# Patient Record
Sex: MALE | Race: WHITE | NOT HISPANIC OR LATINO | Employment: OTHER | URBAN - METROPOLITAN AREA
[De-identification: names, ages, dates, MRNs, and addresses within clinical notes are randomized per-mention and may not be internally consistent; named-entity substitution may affect disease eponyms.]

---

## 2017-01-17 ENCOUNTER — OFFICE VISIT (OUTPATIENT)
Dept: URGENT CARE | Facility: CLINIC | Age: 47
End: 2017-01-17
Payer: COMMERCIAL

## 2017-01-17 VITALS
HEART RATE: 72 BPM | RESPIRATION RATE: 16 BRPM | DIASTOLIC BLOOD PRESSURE: 76 MMHG | BODY MASS INDEX: 28 KG/M2 | SYSTOLIC BLOOD PRESSURE: 130 MMHG | TEMPERATURE: 97.9 F | HEIGHT: 71 IN | WEIGHT: 200 LBS | OXYGEN SATURATION: 94 %

## 2017-01-17 DIAGNOSIS — J40 BRONCHITIS: ICD-10-CM

## 2017-01-17 PROCEDURE — 99203 OFFICE O/P NEW LOW 30 MIN: CPT | Performed by: FAMILY MEDICINE

## 2017-01-17 RX ORDER — DOXYCYCLINE HYCLATE 100 MG
100 TABLET ORAL 2 TIMES DAILY
Qty: 20 TAB | Refills: 0 | Status: SHIPPED | OUTPATIENT
Start: 2017-01-17 | End: 2017-01-27

## 2017-01-17 RX ORDER — CITALOPRAM 20 MG/1
10 TABLET ORAL DAILY
COMMUNITY
End: 2022-03-09

## 2017-01-17 NOTE — PROGRESS NOTES
"HPI: Vincent Lara is a 46 y.o. male who presents with   Chief Complaint   Patient presents with   • Cough     had a cold now in chest, wheezing and painful    Patient presents to urgent care with one week of cough and chest congestion feels that the cough is getting deeper he's had some wheezing and mild shortness of breath related to it when he coughs and it is spasmodic. Perhaps some fevers yesterday. Denies feeling lightheaded or faint. Denies any history of tobacco use or asthma.     Worsened by: activity, laying supine at night, first thing in the morning, when exposed to outside allergens  Improved by: OTC symptomatic medictions    PMH: depression  MEDS:   Current outpatient prescriptions:   •  citalopram (CELEXA) 20 MG Tab, Take 20 mg by mouth every day., Disp: , Rfl:   ALLERGIES: No Known Allergies  SURGHX: History reviewed. No pertinent past surgical history.  SOCHX:  reports that he has never smoked. He has never used smokeless tobacco.  FH: Family history was reviewed, no pertinent findings to report    PE:  Vitals /76 mmHg  Pulse 72  Temp(Src) 36.6 °C (97.9 °F)  Resp 16  Ht 1.803 m (5' 10.98\")  Wt 90.719 kg (200 lb)  BMI 27.91 kg/m2  SpO2 94% pulse ox is slightly compromised   Gen AOx4, NAD  HEENT: moist mucus membranes, no pain or pressure with percussion of frontal, maxillary or ethmoid sinuses.  Bilateral conjunciva clear without erythema or exudate,  Left tm with mild erythema right tm clear without bulge, fluid or loss of landmarks, no pharyngeal erythema or tonsillar exudate or tonsillar enlargement  Neck: supple, no cervical lymphadenopathy, no signs of menigismus  CV/PULM: RRR no murmurs, no rales but ronchi and faint expiratory wheezes are present, no signs of resp distress  Abd soft nontender, bs present  Skin no rashes  Extremities -c/c/e  Neuro appropriate affect,     A/P  1. Bronchitis  doxycycline (VIBRAMYCIN) 100 MG Tab    Hydrocod Polst-CPM Polst ER " (TUSSIONEX) 10-8 MG/5ML Suspension Extended Release     If patient has worsening symptoms over the next several days or does not have significant improvement recommended to get reevaluated perhaps consider chest x-ray.

## 2017-01-17 NOTE — MR AVS SNAPSHOT
"Vincent Lara   2017 9:00 AM   Office Visit   MRN: [id number]    Department:  [facility]   Dept Phone:  [phone]    Description:  Male : 1970   Provider:  Cynthia Smith D.O.           Reason for Visit     Cough had a cold now in chest, wheezing and painful       Allergies as of 2017     No Known Allergies      You were diagnosed with     Bronchitis   [007579]         Vital Signs     Blood Pressure Pulse Temperature Respirations Height Weight    130/76 mmHg 72 36.6 °C (97.9 °F) 16 1.803 m (5' 10.98\") 90.719 kg (200 lb)    Body Mass Index Oxygen Saturation Smoking Status             27.91 kg/m2 94% Never Smoker          Basic Information     Date Of Birth Sex Race Ethnicity Preferred Language    1970 Male White Non- English      Health Maintenance     Patient has no pending health maintenance at this time      Current Immunizations     No immunizations on file.      Below and/or attached are the medications your provider expects you to take. Review all of your home medications and newly ordered medications with your provider and/or pharmacist. Follow medication instructions as directed by your provider and/or pharmacist. Please keep your medication list with you and share with your provider. Update the information when medications are discontinued, doses are changed, or new medications (including over-the-counter products) are added; and carry medication information at all times in the event of emergency situations     Allergies:  No Known Allergies          Medications  Valid as of: 2017 -  9:23 AM    Generic Name Brand Name Tablet Size Instructions for use    Citalopram Hydrobromide (Tab) CELEXA 20 MG Take 20 mg by mouth every day.        Doxycycline Hyclate (Tab) VIBRAMYCIN 100 MG Take 1 Tab by mouth 2 times a day for 10 days.        Hydrocod Polst-Chlorphen Polst (Suspension Extended Release) TUSSIONEX 10-8 MG/5ML Take 5 mL by mouth 2 " times a day as needed for Cough or Rhinitis. Will cause sedation, avoid driving, operating heavy machinery, and drinking alcohol        .                 Medicines prescribed today were sent to:     [facility] [place] [facility], [place] - [address]    [address] [place] [address]    Phone: [phone] Fax: [phone]    Open 24 Hours?: No      Medication refill instructions:       If your prescription bottle indicates you have medication refills left, it is not necessary to call your provider’s office. Please contact your pharmacy and they will refill your medication.    If your prescription bottle indicates you do not have any refills left, you may request refills at any time through one of the following ways: The online [employer] system (except Urgent Care), by calling your provider’s office, or by asking your pharmacy to contact your provider’s office with a refill request. Medication refills are processed only during regular business hours and may not be available until the next business day. Your provider may request additional information or to have a follow-up visit with you prior to refilling your medication.   *Please Note: Medication refills are assigned a new Rx number when refilled electronically. Your pharmacy may indicate that no refills were authorized even though a new prescription for the same medication is available at the pharmacy. Please request the medicine by name with the pharmacy before contacting your provider for a refill.           [employer] Access Code: [email or web address]  Expires: 2/16/2017  9:23 AM    [employer]  A secure, online tool to manage your health information     [employer]’s [employer]® is a secure, online tool that connects you to your personalized health information from the privacy of your home -- day or night - making it very easy for you to manage your healthcare. Once the activation process is completed, you can even access your medical information using the  [employer] anatoliy, which is available for free in the Apple An[employer] store or Google Play store.     [employer] provides the following levels of access (as shown below):   My Chart Features   [facility] Doctor [facility]  Specialists [facility]  Urgent  Care Non-[facility]  Primary Care  Doctor   Email your healthcare team securely and privately 24/7 X X X    Manage appointments: schedule your next appointment; view details of past/upcoming appointments X      Request prescription refills. X      View recent personal medical records, including lab and immunizations X X X X   View health record, including health history, allergies, medications X X X X   Read reports about your outpatient visits, procedures, consult and ER notes X X X X   See your discharge summary, which is a recap of your hospital and/or ER visit that includes your diagnosis, lab results, and care plan. X X       How to register for [employer]:  1. Go to  [email or web address].  2. Click on the Sign Up Now box, which takes you to the New Member Sign Up page. You will need to provide the following information:  a. Enter your [employer] Access Code exactly as it appears at the top of this page. (You will not need to use this code after you’ve completed the sign-up process. If you do not sign up before the expiration date, you must request a new code.)   b. Enter your date of birth.   c. Enter your home email address.   d. Click Submit, and follow the next screen’s instructions.  3. Create a [employer] ID. This will be your [employer] login ID and cannot be changed, so think of one that is secure and easy to remember.  4. Create a [employer] password. You can change your password at any time.  5. Enter your Password Reset Question and Answer. This can be used at a later time if you forget your password.   6. Enter your e-mail address. This allows you to receive e-mail notifications when new information is available in [employer].  7. Click Sign Up. You can now view your health  information.    For assistance activating your [employer] account, call [phone]

## 2019-02-08 ENCOUNTER — OFFICE VISIT (OUTPATIENT)
Dept: URGENT CARE | Facility: CLINIC | Age: 49
End: 2019-02-08
Payer: COMMERCIAL

## 2019-02-08 VITALS
SYSTOLIC BLOOD PRESSURE: 128 MMHG | HEART RATE: 78 BPM | DIASTOLIC BLOOD PRESSURE: 84 MMHG | OXYGEN SATURATION: 94 % | WEIGHT: 218 LBS | TEMPERATURE: 98.4 F | BODY MASS INDEX: 30.52 KG/M2 | HEIGHT: 71 IN

## 2019-02-08 DIAGNOSIS — R55 SYNCOPE AND COLLAPSE: ICD-10-CM

## 2019-02-08 DIAGNOSIS — R06.2 WHEEZING: ICD-10-CM

## 2019-02-08 DIAGNOSIS — R06.02 SOB (SHORTNESS OF BREATH): ICD-10-CM

## 2019-02-08 DIAGNOSIS — R05.9 COUGH: ICD-10-CM

## 2019-02-08 DIAGNOSIS — J22 LRTI (LOWER RESPIRATORY TRACT INFECTION): Primary | ICD-10-CM

## 2019-02-08 PROCEDURE — 94640 AIRWAY INHALATION TREATMENT: CPT | Performed by: NURSE PRACTITIONER

## 2019-02-08 PROCEDURE — 99214 OFFICE O/P EST MOD 30 MIN: CPT | Mod: 25 | Performed by: NURSE PRACTITIONER

## 2019-02-08 RX ORDER — ALBUTEROL SULFATE 2.5 MG/3ML
2.5 SOLUTION RESPIRATORY (INHALATION) ONCE
Status: COMPLETED | OUTPATIENT
Start: 2019-02-08 | End: 2019-02-08

## 2019-02-08 RX ORDER — DOXYCYCLINE HYCLATE 100 MG/1
100 CAPSULE ORAL 2 TIMES DAILY
Qty: 20 CAP | Refills: 0 | Status: SHIPPED | OUTPATIENT
Start: 2019-02-08 | End: 2019-02-18

## 2019-02-08 RX ORDER — ALBUTEROL SULFATE 90 UG/1
2 AEROSOL, METERED RESPIRATORY (INHALATION) EVERY 6 HOURS PRN
Qty: 8.5 G | Refills: 0 | Status: SHIPPED | OUTPATIENT
Start: 2019-02-08 | End: 2019-12-31

## 2019-02-08 RX ADMIN — ALBUTEROL SULFATE 2.5 MG: 2.5 SOLUTION RESPIRATORY (INHALATION) at 10:48

## 2019-02-08 ASSESSMENT — ENCOUNTER SYMPTOMS
HEMOPTYSIS: 0
DIZZINESS: 0
FEVER: 0
SHORTNESS OF BREATH: 1
NECK PAIN: 0
NAUSEA: 0
BACK PAIN: 0
WHEEZING: 1
HEADACHES: 0
SPUTUM PRODUCTION: 0
COUGH: 1
PALPITATIONS: 0

## 2019-02-08 ASSESSMENT — LIFESTYLE VARIABLES: SUBSTANCE_ABUSE: 0

## 2019-02-08 NOTE — PROGRESS NOTES
"Subjective:      Vincent Lara is a 48 y.o. male who presents with Cough (productive with green mucus/lightheaded after coughing/passed out last night x1 month)    Reviewed past medical, surgical and family history with patient in electronic health record today.     No Known Allergies          HPI this is a new problem.  Vincent is a 48-year-old male patient presents with productive cough of green sputum times 1 month.  Symptoms are getting rapidly and progressively worse.  Last night he coughed so hard that he passed out for seconds.  His wife is an advanced practice nurse who witnessed the event.  She said initially she thought he might be having a seizure but then he woke up very quickly.  He did fall to the floor.  No head trauma.  He was not postictal.  No loss of bowel or bladder function.  No oral trauma.  He does have a very strong cough.  He has been feeling rattling in his right chest with an occasional wheeze.  Denies shortness of breath, chest pain.  When he coughs very hard sometimes he feels pain in his head while he is coughing.  The pain quickly resolves when the coughing stops.  No other aggravating or alleviating factors.    Review of Systems   Constitutional: Negative for fever and malaise/fatigue.   HENT: Negative for hearing loss.    Respiratory: Positive for cough, shortness of breath (with activity or coughing. Very mild dyspnea) and wheezing. Negative for hemoptysis and sputum production.    Cardiovascular: Negative for chest pain and palpitations.   Gastrointestinal: Negative for nausea.   Musculoskeletal: Negative for back pain and neck pain.   Neurological: Negative for dizziness and headaches.   Psychiatric/Behavioral: Negative for substance abuse.          Objective:     /84   Pulse 78   Temp 36.9 °C (98.4 °F)   Ht 1.803 m (5' 10.98\")   Wt 98.9 kg (218 lb)   SpO2 94%   BMI 30.42 kg/m²      Physical Exam   Constitutional: He is oriented to person, place, and time. " Vital signs are normal. He appears well-developed and well-nourished.  Non-toxic appearance. No distress.   HENT:   Head: Normocephalic and atraumatic.   Right Ear: Hearing, external ear and ear canal normal. Tympanic membrane is injected.   Left Ear: Hearing, tympanic membrane, external ear and ear canal normal.   Nose: Nose normal.   Mouth/Throat: Uvula is midline and mucous membranes are normal. No uvula swelling. Posterior oropharyngeal erythema present.   Eyes: Pupils are equal, round, and reactive to light. Conjunctivae are normal. Right eye exhibits no discharge. Left eye exhibits no discharge.   Neck: Neck supple.   Cardiovascular: Normal rate and regular rhythm.    Pulmonary/Chest: Effort normal. No accessory muscle usage. He has no decreased breath sounds. He has wheezes. He has no rhonchi. He has rales in the right middle field and the right lower field.   Moist congested cough.  Very strong cough.   Lymphadenopathy:        Head (right side): No submental, no submandibular and no tonsillar adenopathy present.        Head (left side): No submental, no submandibular and no tonsillar adenopathy present.     He has no cervical adenopathy.        Right: No supraclavicular adenopathy present.        Left: No supraclavicular adenopathy present.   Neurological: He is alert and oriented to person, place, and time.   Skin: Skin is warm and dry. Capillary refill takes less than 2 seconds.   Psychiatric: He has a normal mood and affect. His behavior is normal. Thought content normal.   Nursing note and vitals reviewed.        Albuterol Neb. Demonstration &/or evaluation of pt utilization of a nebulizer and evaluation of results. Pt tolerated well. No adverse events. SpO2 post treatment 98%. Improved Breath sounds bilaterally, increased Vt. Persistent rales RLL, RML with scattered rhonchi t/o that does not clear with cough.          Assessment/Plan:       1. LRTI (lower respiratory tract infection)  doxycycline  (VIBRAMYCIN) 100 MG Cap   2. Syncope and collapse      DD: vaso-vagal response to strong coughing   3. Cough     4. Wheezing  albuterol (PROVENTIL) 2.5mg/3ml nebulizer solution 2.5 mg    albuterol 108 (90 Base) MCG/ACT Aero Soln inhalation aerosol     OTC anti-tussive medication of choice to help cough. Dosage and directions per .     Keep well hydrated    Educated in proper administration of medication(s) ordered today including safety, possible SE, risks, benefits, rationale and alternatives to therapy.  May take doxycycline with food to reduce side effects and GI upset.     Return to clinic or PCP 5-7  days if current symptoms are not resolving in a satisfactory manner or sooner if new or worsening symptoms occur.   Patient was advised of signs and symptoms which would warrant further evaluation and /or emergent evaluation in ER.  Verbalized agreement with this treatment plan and seemed to understand without barriers. Questions were encouraged and answered to patients satisfaction.   Aftercare instructions were given to pt/ caregiver.

## 2019-02-10 ENCOUNTER — TELEPHONE (OUTPATIENT)
Dept: URGENT CARE | Facility: CLINIC | Age: 49
End: 2019-02-10

## 2019-02-11 ENCOUNTER — TELEPHONE (OUTPATIENT)
Dept: URGENT CARE | Facility: CLINIC | Age: 49
End: 2019-02-11

## 2019-02-11 ENCOUNTER — APPOINTMENT (OUTPATIENT)
Dept: RADIOLOGY | Facility: IMAGING CENTER | Age: 49
End: 2019-02-11
Attending: NURSE PRACTITIONER
Payer: COMMERCIAL

## 2019-02-11 ENCOUNTER — APPOINTMENT (OUTPATIENT)
Dept: URGENT CARE | Facility: CLINIC | Age: 49
End: 2019-02-11
Payer: COMMERCIAL

## 2019-02-11 DIAGNOSIS — R06.02 SOB (SHORTNESS OF BREATH): ICD-10-CM

## 2019-02-11 DIAGNOSIS — R05.9 COUGH: ICD-10-CM

## 2019-02-11 DIAGNOSIS — R55 SYNCOPE AND COLLAPSE: ICD-10-CM

## 2019-02-11 DIAGNOSIS — J22 LRTI (LOWER RESPIRATORY TRACT INFECTION): ICD-10-CM

## 2019-02-11 DIAGNOSIS — R06.2 WHEEZING: ICD-10-CM

## 2019-02-11 PROCEDURE — 71046 X-RAY EXAM CHEST 2 VIEWS: CPT | Mod: 26 | Performed by: FAMILY MEDICINE

## 2019-02-11 RX ORDER — AMOXICILLIN AND CLAVULANATE POTASSIUM 875; 125 MG/1; MG/1
1 TABLET, FILM COATED ORAL 2 TIMES DAILY
Qty: 20 TAB | Refills: 0 | Status: SHIPPED | OUTPATIENT
Start: 2019-02-11 | End: 2019-02-21

## 2020-01-06 ENCOUNTER — ANESTHESIA (OUTPATIENT)
Dept: SURGERY | Facility: MEDICAL CENTER | Age: 50
End: 2020-01-06
Payer: COMMERCIAL

## 2020-01-06 ENCOUNTER — HOSPITAL ENCOUNTER (OUTPATIENT)
Facility: MEDICAL CENTER | Age: 50
End: 2020-01-06
Attending: SURGERY | Admitting: SURGERY
Payer: COMMERCIAL

## 2020-01-06 ENCOUNTER — ANESTHESIA EVENT (OUTPATIENT)
Dept: SURGERY | Facility: MEDICAL CENTER | Age: 50
End: 2020-01-06
Payer: COMMERCIAL

## 2020-01-06 VITALS
OXYGEN SATURATION: 94 % | SYSTOLIC BLOOD PRESSURE: 114 MMHG | WEIGHT: 210.1 LBS | BODY MASS INDEX: 30.08 KG/M2 | TEMPERATURE: 99.1 F | DIASTOLIC BLOOD PRESSURE: 64 MMHG | HEART RATE: 75 BPM | HEIGHT: 70 IN | RESPIRATION RATE: 18 BRPM

## 2020-01-06 PROCEDURE — 160029 HCHG SURGERY MINUTES - 1ST 30 MINS LEVEL 4: Performed by: SURGERY

## 2020-01-06 PROCEDURE — 160025 RECOVERY II MINUTES (STATS): Performed by: SURGERY

## 2020-01-06 PROCEDURE — 160002 HCHG RECOVERY MINUTES (STAT): Performed by: SURGERY

## 2020-01-06 PROCEDURE — 160041 HCHG SURGERY MINUTES - EA ADDL 1 MIN LEVEL 4: Performed by: SURGERY

## 2020-01-06 PROCEDURE — 502714 HCHG ROBOTIC SURGERY SERVICES: Performed by: SURGERY

## 2020-01-06 PROCEDURE — 700101 HCHG RX REV CODE 250: Performed by: ANESTHESIOLOGY

## 2020-01-06 PROCEDURE — 501838 HCHG SUTURE GENERAL: Performed by: SURGERY

## 2020-01-06 PROCEDURE — 700102 HCHG RX REV CODE 250 W/ 637 OVERRIDE(OP): Performed by: ANESTHESIOLOGY

## 2020-01-06 PROCEDURE — 160009 HCHG ANES TIME/MIN: Performed by: SURGERY

## 2020-01-06 PROCEDURE — 700111 HCHG RX REV CODE 636 W/ 250 OVERRIDE (IP)

## 2020-01-06 PROCEDURE — 700101 HCHG RX REV CODE 250: Performed by: SURGERY

## 2020-01-06 PROCEDURE — 500868 HCHG NEEDLE, SURGI(VARES): Performed by: SURGERY

## 2020-01-06 PROCEDURE — 500002 HCHG ADHESIVE, DERMABOND: Performed by: SURGERY

## 2020-01-06 PROCEDURE — 700105 HCHG RX REV CODE 258: Performed by: SURGERY

## 2020-01-06 PROCEDURE — 160035 HCHG PACU - 1ST 60 MINS PHASE I: Performed by: SURGERY

## 2020-01-06 PROCEDURE — 700111 HCHG RX REV CODE 636 W/ 250 OVERRIDE (IP): Performed by: ANESTHESIOLOGY

## 2020-01-06 PROCEDURE — 160046 HCHG PACU - 1ST 60 MINS PHASE II: Performed by: SURGERY

## 2020-01-06 PROCEDURE — A9270 NON-COVERED ITEM OR SERVICE: HCPCS | Performed by: ANESTHESIOLOGY

## 2020-01-06 PROCEDURE — 160048 HCHG OR STATISTICAL LEVEL 1-5: Performed by: SURGERY

## 2020-01-06 PROCEDURE — 160047 HCHG PACU  - EA ADDL 30 MINS PHASE II: Performed by: SURGERY

## 2020-01-06 PROCEDURE — C1781 MESH (IMPLANTABLE): HCPCS | Performed by: SURGERY

## 2020-01-06 DEVICE — MESH PROGRIP LAPROSCOPIC SELF FIXATING (1/CA): Type: IMPLANTABLE DEVICE | Status: FUNCTIONAL

## 2020-01-06 RX ORDER — MIDAZOLAM HYDROCHLORIDE 1 MG/ML
INJECTION INTRAMUSCULAR; INTRAVENOUS PRN
Status: DISCONTINUED | OUTPATIENT
Start: 2020-01-06 | End: 2020-01-06 | Stop reason: SURG

## 2020-01-06 RX ORDER — OXYCODONE HCL 5 MG/5 ML
10 SOLUTION, ORAL ORAL
Status: COMPLETED | OUTPATIENT
Start: 2020-01-06 | End: 2020-01-06

## 2020-01-06 RX ORDER — SODIUM CHLORIDE, SODIUM LACTATE, POTASSIUM CHLORIDE, CALCIUM CHLORIDE 600; 310; 30; 20 MG/100ML; MG/100ML; MG/100ML; MG/100ML
INJECTION, SOLUTION INTRAVENOUS CONTINUOUS
Status: DISCONTINUED | OUTPATIENT
Start: 2020-01-06 | End: 2020-01-06 | Stop reason: HOSPADM

## 2020-01-06 RX ORDER — CEFAZOLIN SODIUM 1 G/3ML
INJECTION, POWDER, FOR SOLUTION INTRAMUSCULAR; INTRAVENOUS PRN
Status: DISCONTINUED | OUTPATIENT
Start: 2020-01-06 | End: 2020-01-06 | Stop reason: SURG

## 2020-01-06 RX ORDER — SCOLOPAMINE TRANSDERMAL SYSTEM 1 MG/1
PATCH, EXTENDED RELEASE TRANSDERMAL
Status: DISCONTINUED
Start: 2020-01-06 | End: 2020-01-06 | Stop reason: HOSPADM

## 2020-01-06 RX ORDER — HYDRALAZINE HYDROCHLORIDE 20 MG/ML
5 INJECTION INTRAMUSCULAR; INTRAVENOUS
Status: DISCONTINUED | OUTPATIENT
Start: 2020-01-06 | End: 2020-01-06 | Stop reason: HOSPADM

## 2020-01-06 RX ORDER — ACETAMINOPHEN 500 MG
1000 TABLET ORAL ONCE
Status: COMPLETED | OUTPATIENT
Start: 2020-01-06 | End: 2020-01-06

## 2020-01-06 RX ORDER — DEXAMETHASONE SODIUM PHOSPHATE 4 MG/ML
INJECTION, SOLUTION INTRA-ARTICULAR; INTRALESIONAL; INTRAMUSCULAR; INTRAVENOUS; SOFT TISSUE PRN
Status: DISCONTINUED | OUTPATIENT
Start: 2020-01-06 | End: 2020-01-06 | Stop reason: SURG

## 2020-01-06 RX ORDER — HYDROMORPHONE HYDROCHLORIDE 1 MG/ML
0.2 INJECTION, SOLUTION INTRAMUSCULAR; INTRAVENOUS; SUBCUTANEOUS
Status: DISCONTINUED | OUTPATIENT
Start: 2020-01-06 | End: 2020-01-06 | Stop reason: HOSPADM

## 2020-01-06 RX ORDER — KETOROLAC TROMETHAMINE 30 MG/ML
INJECTION, SOLUTION INTRAMUSCULAR; INTRAVENOUS PRN
Status: DISCONTINUED | OUTPATIENT
Start: 2020-01-06 | End: 2020-01-06 | Stop reason: SURG

## 2020-01-06 RX ORDER — OXYCODONE HCL 5 MG/5 ML
5 SOLUTION, ORAL ORAL
Status: COMPLETED | OUTPATIENT
Start: 2020-01-06 | End: 2020-01-06

## 2020-01-06 RX ORDER — HYDROMORPHONE HYDROCHLORIDE 1 MG/ML
0.1 INJECTION, SOLUTION INTRAMUSCULAR; INTRAVENOUS; SUBCUTANEOUS
Status: DISCONTINUED | OUTPATIENT
Start: 2020-01-06 | End: 2020-01-06 | Stop reason: HOSPADM

## 2020-01-06 RX ORDER — BUPIVACAINE HYDROCHLORIDE AND EPINEPHRINE 5; 5 MG/ML; UG/ML
INJECTION, SOLUTION EPIDURAL; INTRACAUDAL; PERINEURAL
Status: DISCONTINUED | OUTPATIENT
Start: 2020-01-06 | End: 2020-01-06 | Stop reason: HOSPADM

## 2020-01-06 RX ORDER — LABETALOL HYDROCHLORIDE 5 MG/ML
5 INJECTION, SOLUTION INTRAVENOUS
Status: DISCONTINUED | OUTPATIENT
Start: 2020-01-06 | End: 2020-01-06 | Stop reason: HOSPADM

## 2020-01-06 RX ORDER — ONDANSETRON 2 MG/ML
4 INJECTION INTRAMUSCULAR; INTRAVENOUS
Status: DISCONTINUED | OUTPATIENT
Start: 2020-01-06 | End: 2020-01-06 | Stop reason: HOSPADM

## 2020-01-06 RX ORDER — ONDANSETRON 2 MG/ML
INJECTION INTRAMUSCULAR; INTRAVENOUS PRN
Status: DISCONTINUED | OUTPATIENT
Start: 2020-01-06 | End: 2020-01-06 | Stop reason: SURG

## 2020-01-06 RX ORDER — ROCURONIUM BROMIDE 10 MG/ML
INJECTION, SOLUTION INTRAVENOUS PRN
Status: DISCONTINUED | OUTPATIENT
Start: 2020-01-06 | End: 2020-01-06 | Stop reason: SURG

## 2020-01-06 RX ORDER — HALOPERIDOL 5 MG/ML
1 INJECTION INTRAMUSCULAR
Status: DISCONTINUED | OUTPATIENT
Start: 2020-01-06 | End: 2020-01-06 | Stop reason: HOSPADM

## 2020-01-06 RX ORDER — MEPERIDINE HYDROCHLORIDE 25 MG/ML
12.5 INJECTION INTRAMUSCULAR; INTRAVENOUS; SUBCUTANEOUS
Status: DISCONTINUED | OUTPATIENT
Start: 2020-01-06 | End: 2020-01-06 | Stop reason: HOSPADM

## 2020-01-06 RX ORDER — LIDOCAINE HYDROCHLORIDE 20 MG/ML
INJECTION, SOLUTION EPIDURAL; INFILTRATION; INTRACAUDAL; PERINEURAL PRN
Status: DISCONTINUED | OUTPATIENT
Start: 2020-01-06 | End: 2020-01-06 | Stop reason: SURG

## 2020-01-06 RX ORDER — HYDROMORPHONE HYDROCHLORIDE 1 MG/ML
0.4 INJECTION, SOLUTION INTRAMUSCULAR; INTRAVENOUS; SUBCUTANEOUS
Status: DISCONTINUED | OUTPATIENT
Start: 2020-01-06 | End: 2020-01-06 | Stop reason: HOSPADM

## 2020-01-06 RX ORDER — LORAZEPAM 2 MG/ML
0.5 INJECTION INTRAMUSCULAR
Status: DISCONTINUED | OUTPATIENT
Start: 2020-01-06 | End: 2020-01-06 | Stop reason: HOSPADM

## 2020-01-06 RX ORDER — DIPHENHYDRAMINE HYDROCHLORIDE 50 MG/ML
12.5 INJECTION INTRAMUSCULAR; INTRAVENOUS
Status: DISCONTINUED | OUTPATIENT
Start: 2020-01-06 | End: 2020-01-06 | Stop reason: HOSPADM

## 2020-01-06 RX ORDER — LIDOCAINE HYDROCHLORIDE 10 MG/ML
INJECTION, SOLUTION EPIDURAL; INFILTRATION; INTRACAUDAL; PERINEURAL
Status: COMPLETED
Start: 2020-01-06 | End: 2020-01-06

## 2020-01-06 RX ORDER — LIDOCAINE HYDROCHLORIDE 40 MG/ML
SOLUTION TOPICAL PRN
Status: DISCONTINUED | OUTPATIENT
Start: 2020-01-06 | End: 2020-01-06 | Stop reason: SURG

## 2020-01-06 RX ADMIN — OXYCODONE HYDROCHLORIDE 10 MG: 5 SOLUTION ORAL at 15:44

## 2020-01-06 RX ADMIN — ONDANSETRON 4 MG: 2 INJECTION INTRAMUSCULAR; INTRAVENOUS at 15:27

## 2020-01-06 RX ADMIN — FENTANYL CITRATE 150 MCG: 50 INJECTION, SOLUTION INTRAMUSCULAR; INTRAVENOUS at 14:28

## 2020-01-06 RX ADMIN — FENTANYL CITRATE 100 MCG: 50 INJECTION, SOLUTION INTRAMUSCULAR; INTRAVENOUS at 15:03

## 2020-01-06 RX ADMIN — LIDOCAINE HYDROCHLORIDE 4 ML: 40 SOLUTION TOPICAL at 14:29

## 2020-01-06 RX ADMIN — ROCURONIUM BROMIDE 50 MG: 10 INJECTION, SOLUTION INTRAVENOUS at 14:28

## 2020-01-06 RX ADMIN — MIDAZOLAM 2 MG: 1 INJECTION INTRAMUSCULAR; INTRAVENOUS at 14:24

## 2020-01-06 RX ADMIN — FENTANYL CITRATE 50 MCG: 0.05 INJECTION, SOLUTION INTRAMUSCULAR; INTRAVENOUS at 15:46

## 2020-01-06 RX ADMIN — SUGAMMADEX 200 MG: 100 INJECTION, SOLUTION INTRAVENOUS at 15:27

## 2020-01-06 RX ADMIN — ACETAMINOPHEN 1000 MG: 500 TABLET ORAL at 13:27

## 2020-01-06 RX ADMIN — LIDOCAINE HYDROCHLORIDE 100 MG: 20 INJECTION, SOLUTION EPIDURAL; INFILTRATION; INTRACAUDAL at 14:28

## 2020-01-06 RX ADMIN — CEFAZOLIN 2 G: 330 INJECTION, POWDER, FOR SOLUTION INTRAMUSCULAR; INTRAVENOUS at 14:32

## 2020-01-06 RX ADMIN — LIDOCAINE HYDROCHLORIDE 0.5 ML: 10 INJECTION, SOLUTION EPIDURAL; INFILTRATION; INTRACAUDAL at 13:27

## 2020-01-06 RX ADMIN — KETOROLAC TROMETHAMINE 30 MG: 30 INJECTION, SOLUTION INTRAMUSCULAR at 15:27

## 2020-01-06 RX ADMIN — SODIUM CHLORIDE, POTASSIUM CHLORIDE, SODIUM LACTATE AND CALCIUM CHLORIDE: 600; 310; 30; 20 INJECTION, SOLUTION INTRAVENOUS at 13:26

## 2020-01-06 RX ADMIN — DEXAMETHASONE SODIUM PHOSPHATE 8 MG: 4 INJECTION, SOLUTION INTRA-ARTICULAR; INTRALESIONAL; INTRAMUSCULAR; INTRAVENOUS; SOFT TISSUE at 14:32

## 2020-01-06 RX ADMIN — PROPOFOL 200 MG: 10 INJECTION, EMULSION INTRAVENOUS at 14:28

## 2020-01-06 RX ADMIN — Medication 0.5 ML: at 13:27

## 2020-01-06 RX ADMIN — ROCURONIUM BROMIDE 50 MG: 10 INJECTION, SOLUTION INTRAVENOUS at 15:00

## 2020-01-06 SDOH — HEALTH STABILITY: MENTAL HEALTH: HOW MANY STANDARD DRINKS CONTAINING ALCOHOL DO YOU HAVE ON A TYPICAL DAY?: 3 OR 4

## 2020-01-06 SDOH — HEALTH STABILITY: MENTAL HEALTH: HOW OFTEN DO YOU HAVE 6 OR MORE DRINKS ON ONE OCCASION?: NEVER

## 2020-01-06 SDOH — HEALTH STABILITY: MENTAL HEALTH: HOW OFTEN DO YOU HAVE A DRINK CONTAINING ALCOHOL?: 4 OR MORE TIMES A WEEK

## 2020-01-06 NOTE — OR SURGEON
Immediate Post OP Note    PreOp Diagnosis:bih     PostOp Diagnosis: bih type 2 left ,type 4 right     Procedure(s):  REPAIR, HERNIA, INGUINAL, ROBOT-ASSISTED, USING DA LENNOX XI-WITH MESH - Wound Class: Clean    Surgeon(s):  CARLOZ Peters M.D.    Anesthesiologist/Type of Anesthesia:  Anesthesiologist: Corin Samayoa M.D./General    Surgical Staff:  Circulator: Yanira Comer R.N.  Relief Circulator: Eva Concepcion R.N.  Scrub Person: Brittany Mosley    Specimens removed if any:  * No specimens in log *    Estimated Blood Loss: 10    Findings: same    Complications: 0        1/6/2020 3:28 PM Randal Burnett M.D.

## 2020-01-06 NOTE — ANESTHESIA PROCEDURE NOTES
Airway  Date/Time: 1/6/2020 2:30 PM  Performed by: Corin Samayoa M.D.  Authorized by: Corin Samayoa M.D.     Location:  OR  Urgency:  Elective  Indications for Airway Management:  Anesthesia  Spontaneous Ventilation: absent    Sedation Level:  Deep  Preoxygenated: Yes    Patient Position:  Sniffing  Mask Difficulty Assessment:  1 - vent by mask  Final Airway Type:  Endotracheal airway  Final Endotracheal Airway:  ETT  Cuffed: Yes    Technique Used for Successful ETT Placement:  Direct laryngoscopy  Devices/Methods Used in Placement:  Intubating stylet  Insertion Site:  Oral  Blade Type:  Skeou  Laryngoscope Blade/Videolaryngoscope Blade Size:  3  ETT Size (mm):  7.0  Measured from:  Teeth  ETT to Teeth (cm):  21  Placement Verified by: auscultation and capnometry    Cormack-Lehane Classification:  Grade I - full view of glottis  Number of Attempts at Approach:  1

## 2020-01-06 NOTE — ANESTHESIA PREPROCEDURE EVALUATION
Relevant Problems   No relevant active problems       Physical Exam    Airway   Mallampati: I  TM distance: >3 FB  Neck ROM: full       Cardiovascular - normal exam     Dental - normal exam         Pulmonary   Breath sounds clear to auscultation     Abdominal   (+) obese     Neurological - normal exam                 Anesthesia Plan    ASA 2       Plan - general       Airway plan will be ETT        Induction: intravenous      Pertinent diagnostic labs and testing reviewed    Informed Consent:    Anesthetic plan and risks discussed with patient.

## 2020-01-06 NOTE — PROGRESS NOTES
Pre-op complete. Multimodals administered per MAR. Patient updated on plan of care. All questions answered at this time.  Call light within reach, advised to call for any questions or concerns. Hourly rounding in place.

## 2020-01-07 ASSESSMENT — PAIN SCALES - GENERAL: PAIN_LEVEL: 3

## 2020-01-07 NOTE — ANESTHESIA POSTPROCEDURE EVALUATION
Patient: Vincent Lara    Procedure Summary     Date:  01/06/20 Room / Location:  [facility] OR 11 / SURGERY [facility]    Anesthesia Start:  1424 Anesthesia Stop:  1538    Procedure:  REPAIR, HERNIA, INGUINAL, ROBOT-ASSISTED, USING DA LENNOX XI-WITH MESH (Bilateral Abdomen) Diagnosis:  (BILATERAL INGUINAL HERNIAS)    Surgeon:  Randal Burnett M.D. Responsible Provider:  Corin Samayoa M.D.    Anesthesia Type:  general ASA Status:  2          Final Anesthesia Type: general  Last vitals  BP   Blood Pressure: 114/64    Temp   37.3 °C (99.1 °F)    Pulse   Pulse: 75   Resp   18    SpO2   94 %      Anesthesia Post Evaluation    Patient location during evaluation: PACU  Patient participation: complete - patient participated  Level of consciousness: awake and alert  Pain score: 3    Airway patency: patent  Anesthetic complications: no  Cardiovascular status: adequate  Respiratory status: acceptable  Hydration status: acceptable    PONV: none           Nurse Pain Score: 3 (NPRS)

## 2020-01-07 NOTE — PROGRESS NOTES
Walked patient in the shah.  His gait was steady.  BP wnl.  States he his ready to get dressed and go home

## 2020-01-07 NOTE — OP REPORT
DATE OF SERVICE:  01/06/2020    PREOPERATIVE DIAGNOSIS:  Bilateral inguinal hernias.    POSTOPERATIVE DIAGNOSIS:  Bilateral inguinal hernias.    OPERATION PERFORMED:  Laparoscopic robotic-assisted repair of bilateral   inguinal hernias with ProGrip mesh implantation.    SURGEON:  Randal Burnett MD    ANESTHESIOLOGIST:  Corin Samayoa MD    ANESTHESIA:  General anesthesia.    ASSISTANT:  Pedro Pablo Sousa DO    OPERATIVE NOTE:  The patient is 49 years of age, presents with bilateral   symptomatic hernias, worse on the right than the left.  He was felt to be a   candidate for elective repair.  The risks, possible complications of which   were carefully explained to him in detail.  He understood and accepted those   risks and wished to proceed.  He was taken to the operating room and placed   under anesthesia by Dr. Samayoa.  After satisfactory preoperative evaluation,   he did receive prophylactic antibiotics.  He had sequential stockings applied   as anti-embolism prophylaxis.  His abdomen was shaved and prepped with   ChloraPrep solution.  Sterile drapes were applied.  A time-out was affected.    A solution of 0.5% Marcaine with epinephrine was liberally infiltrated into   all incisions.  Incision was made in the left upper quadrant.  The fascia was   elevated.  A Veress needle was inserted.  Saline drop test was permissive to   proceed with step pneumo insufflation.  Once fully pneumo insufflated, an 8-mm   trocar was placed.  There was no evidence of injury related to entry.    Patient had an 8-mm trocar placed to the right of the midline in the   epigastrium and an 8-mm trocar was placed in the right upper quadrant.  These   latter two were placed under direct vision.    The patient was positioned in Trendelenburg.  Bilateral transversus abdominis   blocks were made.  The patient had the da Mary Xi robot brought in.  It was   docked.  Instrumentation and camera introduced.  Dr. Burnett retired to the    console.    Patient had what appeared to be bilateral direct inguinal hernias.  It proved   to be that he had a type 2 hernia with some indirect component on the left and   had direct, indirect and femoral hernias on the right.  The patient had   incision made above the pelvic outlet in the peritoneum.  A peritoneal pocket   was created cephalad caudally.  This was carried down across the pubic   symphysis and across the Ed's ligament into the pelvis.  Lateral   dissection was then completed with reduction of indirect sac and lipoma of the   cords on the right.  The peritoneum was fully mobilized.  A similar   dissection was carried out on the right.  Here, he had a direct, indirect and   femoral hernia.  These were completely dissected out.  The patient had two   10x15 cm ProGrip meshes introduced, unfurled, and deployed across the pelvic   outlets on the left and right side.  The peritoneum was then closed using   running 2-0 Stratafix suture in 2 layers.    Once these were reperitonealized, the procedure was completed with retrieval   of the needles from the suture material.  The meshes appeared to be well   deployed in the retroperitoneal space.  The patient had pneumoperitoneum then   collapsed.  The robot was undocked.  Instrumentation and camera removed.  The   skin was closed in all incisions using running 4-0 Monocryl subcuticular.  The   wounds were sealed with Dermabond.  The patient was awakened, extubated, and   taken to recovery room in stable satisfactory condition.  Blood loss was truly   minimal.  There were no intraoperative complications and the procedure was   well tolerated by the patient who is anticipated to be treated on an   ambulatory basis.  He was written a prescription for oxycodone IR 5 mg #30 in   compliance with [place] regulation and will take multimodal pain medication   including Tylenol and ibuprofen.  The patient was instructed to call if any   problems arose interim from  discharge to followup.       ____________________________________     MD GUILLERMO [facility] / DANNY    DD:  01/06/2020 15:34:55  DT:  01/06/2020 16:15:48    D#:  [id number]  Job#:  [id number]    cc: Corin Samayoa MD

## 2020-01-07 NOTE — ANESTHESIA TIME REPORT
Anesthesia Start and Stop Event Times     Date Time Event    1/6/2020 1424 Ready for Procedure     1424 Anesthesia Start     1538 Anesthesia Stop        Responsible Staff  01/06/20    Name Role Begin End    Corin Samayoa M.D. Anesth 1424 1538        Preop Diagnosis (Free Text):  Pre-op Diagnosis     BILATERAL INGUINAL HERNIAS        Preop Diagnosis (Codes):    Post op Diagnosis  Bilateral inguinal hernia      Premium Reason  A. 3PM - 7AM    Comments:

## 2020-01-07 NOTE — OR NURSING
Pt awake and alert, reports pain 2/10 after pain medication, denies nausea at this time, wife updated on pt status.

## 2020-01-07 NOTE — DISCHARGE INSTRUCTIONS
ACTIVITY: Rest and take it easy for the first 24 hours.  A responsible adult is recommended to remain with you during that time.  It is normal to feel sleepy.  We encourage you to not do anything that requires balance, judgment or coordination.    MILD FLU-LIKE SYMPTOMS ARE NORMAL. YOU MAY EXPERIENCE GENERALIZED MUSCLE ACHES, THROAT IRRITATION, HEADACHE AND/OR SOME NAUSEA.    FOR 24 HOURS DO NOT:  Drive, operate machinery or run household appliances.  Drink beer or alcoholic beverages.   Make important decisions or sign legal documents.    SPECIAL INSTRUCTIONS: OK to shower. Do not remove glue from surgical site.    DIET: To avoid nausea, slowly advance diet as tolerated, avoiding spicy or greasy foods for the first day.  Add more substantial food to your diet according to your physician's instructions.  Babies can be fed formula or breast milk as soon as they are hungry.  INCREASE FLUIDS AND FIBER TO AVOID CONSTIPATION.    SURGICAL DRESSING/BATHING: Follow surgeon's instructions    FOLLOW-UP APPOINTMENT:  A follow-up appointment should be arranged with your doctor in 1-2 weeks; call to schedule.    You should CALL YOUR PHYSICIAN if you develop:  Fever greater than 101 degrees F.  Pain not relieved by medication, or persistent nausea or vomiting.  Excessive bleeding (blood soaking through dressing) or unexpected drainage from the wound.  Extreme redness or swelling around the incision site, drainage of pus or foul smelling drainage.  Inability to urinate or empty your bladder within 8 hours.  Problems with breathing or chest pain.    You should call 911 if you develop problems with breathing or chest pain.  If you are unable to contact your doctor or surgical center, you should go to the nearest emergency room or urgent care center.  Physician's telephone #: [phone]    If any questions arise, call your doctor.  If your doctor is not available, please feel free to call the Surgical Center at [phone].   The Center is open Monday through Friday from 7AM to 7PM.  You can also call the HEALTH HOTLINE open 24 hours/day, 7 days/week and speak to a nurse at [phone], or toll free at [phone].    A registered nurse may call you a few days after your surgery to see how you are doing after your procedure.    MEDICATIONS: Resume taking daily medication.  Take prescribed pain medication with food.  If no medication is prescribed, you may take non-aspirin pain medication if needed.  PAIN MEDICATION CAN BE VERY CONSTIPATING.  Take a stool softener or laxative such as senokot, pericolace, or milk of magnesia if needed.    Prescription given for oxycodone.  Last pain medication given at 3:44 pm.    If your physician has prescribed pain medication that includes Acetaminophen (Tylenol), do not take additional Acetaminophen (Tylenol) while taking the prescribed medication.    Depression / Suicide Risk    As you are discharged from this [facility] facility, it is important to learn how to keep safe from harming yourself.    Recognize the warning signs:  · Abrupt changes in personality, positive or negative- including increase in energy   · Giving away possessions  · Change in eating patterns- significant weight changes-  positive or negative  · Change in sleeping patterns- unable to sleep or sleeping all the time   · Unwillingness or inability to communicate  · Depression  · Unusual sadness, discouragement and loneliness  · Talk of wanting to die  · Neglect of personal appearance   · Rebelliousness- reckless behavior  · Withdrawal from people/activities they love  · Confusion- inability to concentrate     If you or a loved one observes any of these behaviors or has concerns about self-harm, here's what you can do:  · Talk about it- your feelings and reasons for harming yourself  · Remove any means that you might use to hurt yourself (examples: pills, rope, extension cords, firearm)  · Get professional help from the  community (Mental Health, Substance Abuse, psychological counseling)  · Do not be alone:Call your Safe Contact- someone whom you trust who will be there for you.  · Call your local CRISIS HOTLINE 167-4850 or 694-721-5260  · Call your local Children's Mobile Crisis Response Team [place] (981) 152-7886 or www.[place]  · Call the toll free National Suicide Prevention Hotlines   · National Suicide Prevention Lifeline 027-000-BLGL (1633)  · National Hope Line Network 800-SUICIDE (985-5210)

## 2022-02-11 ENCOUNTER — APPOINTMENT (OUTPATIENT)
Dept: RADIOLOGY | Facility: MEDICAL CENTER | Age: 52
End: 2022-02-11
Attending: EMERGENCY MEDICINE
Payer: COMMERCIAL

## 2022-02-11 ENCOUNTER — HOSPITAL ENCOUNTER (EMERGENCY)
Facility: MEDICAL CENTER | Age: 52
End: 2022-02-11
Attending: EMERGENCY MEDICINE
Payer: COMMERCIAL

## 2022-02-11 VITALS
RESPIRATION RATE: 15 BRPM | HEART RATE: 77 BPM | WEIGHT: 215.61 LBS | HEIGHT: 70 IN | BODY MASS INDEX: 30.87 KG/M2 | DIASTOLIC BLOOD PRESSURE: 70 MMHG | TEMPERATURE: 98 F | OXYGEN SATURATION: 94 % | SYSTOLIC BLOOD PRESSURE: 109 MMHG

## 2022-02-11 DIAGNOSIS — I48.0 PAROXYSMAL ATRIAL FIBRILLATION (HCC): ICD-10-CM

## 2022-02-11 DIAGNOSIS — R07.89 CHEST TIGHTNESS: ICD-10-CM

## 2022-02-11 LAB
ALBUMIN SERPL BCP-MCNC: 4 G/DL (ref 3.2–4.9)
ALBUMIN/GLOB SERPL: 1.6 G/DL
ALP SERPL-CCNC: 52 U/L (ref 30–99)
ALT SERPL-CCNC: 18 U/L (ref 2–50)
ANION GAP SERPL CALC-SCNC: 12 MMOL/L (ref 7–16)
APTT PPP: 27.9 SEC (ref 24.7–36)
AST SERPL-CCNC: 20 U/L (ref 12–45)
BASOPHILS # BLD AUTO: 0.4 % (ref 0–1.8)
BASOPHILS # BLD: 0.03 K/UL (ref 0–0.12)
BILIRUB SERPL-MCNC: 0.3 MG/DL (ref 0.1–1.5)
BUN SERPL-MCNC: 19 MG/DL (ref 8–22)
CALCIUM SERPL-MCNC: 8.8 MG/DL (ref 8.4–10.2)
CHLORIDE SERPL-SCNC: 104 MMOL/L (ref 96–112)
CO2 SERPL-SCNC: 21 MMOL/L (ref 20–33)
CREAT SERPL-MCNC: 0.86 MG/DL (ref 0.5–1.4)
EKG IMPRESSION: NORMAL
EKG IMPRESSION: NORMAL
EOSINOPHIL # BLD AUTO: 0.41 K/UL (ref 0–0.51)
EOSINOPHIL NFR BLD: 6.1 % (ref 0–6.9)
ERYTHROCYTE [DISTWIDTH] IN BLOOD BY AUTOMATED COUNT: 41.3 FL (ref 35.9–50)
GLOBULIN SER CALC-MCNC: 2.5 G/DL (ref 1.9–3.5)
GLUCOSE SERPL-MCNC: 112 MG/DL (ref 65–99)
HCT VFR BLD AUTO: 44.8 % (ref 42–52)
HGB BLD-MCNC: 16 G/DL (ref 14–18)
IMM GRANULOCYTES # BLD AUTO: 0.02 K/UL (ref 0–0.11)
IMM GRANULOCYTES NFR BLD AUTO: 0.3 % (ref 0–0.9)
INR PPP: 0.99 (ref 0.87–1.13)
LYMPHOCYTES # BLD AUTO: 2.47 K/UL (ref 1–4.8)
LYMPHOCYTES NFR BLD: 37 % (ref 22–41)
MAGNESIUM SERPL-MCNC: 2 MG/DL (ref 1.5–2.5)
MCH RBC QN AUTO: 33.3 PG (ref 27–33)
MCHC RBC AUTO-ENTMCNC: 35.7 G/DL (ref 33.7–35.3)
MCV RBC AUTO: 93.3 FL (ref 81.4–97.8)
MONOCYTES # BLD AUTO: 0.55 K/UL (ref 0–0.85)
MONOCYTES NFR BLD AUTO: 8.2 % (ref 0–13.4)
NEUTROPHILS # BLD AUTO: 3.19 K/UL (ref 1.82–7.42)
NEUTROPHILS NFR BLD: 48 % (ref 44–72)
NRBC # BLD AUTO: 0 K/UL
NRBC BLD-RTO: 0 /100 WBC
NT-PROBNP SERPL IA-MCNC: 37 PG/ML (ref 0–125)
PLATELET # BLD AUTO: 231 K/UL (ref 164–446)
PMV BLD AUTO: 8.5 FL (ref 9–12.9)
POTASSIUM SERPL-SCNC: 3.5 MMOL/L (ref 3.6–5.5)
PROT SERPL-MCNC: 6.5 G/DL (ref 6–8.2)
PROTHROMBIN TIME: 12.3 SEC (ref 12–14.6)
RBC # BLD AUTO: 4.8 M/UL (ref 4.7–6.1)
SODIUM SERPL-SCNC: 137 MMOL/L (ref 135–145)
TROPONIN T SERPL-MCNC: <6 NG/L (ref 6–19)
TSH SERPL DL<=0.005 MIU/L-ACNC: 3.62 UIU/ML (ref 0.38–5.33)
WBC # BLD AUTO: 6.7 K/UL (ref 4.8–10.8)

## 2022-02-11 PROCEDURE — 85730 THROMBOPLASTIN TIME PARTIAL: CPT

## 2022-02-11 PROCEDURE — 85610 PROTHROMBIN TIME: CPT

## 2022-02-11 PROCEDURE — 93005 ELECTROCARDIOGRAM TRACING: CPT | Performed by: EMERGENCY MEDICINE

## 2022-02-11 PROCEDURE — 84484 ASSAY OF TROPONIN QUANT: CPT

## 2022-02-11 PROCEDURE — 99284 EMERGENCY DEPT VISIT MOD MDM: CPT

## 2022-02-11 PROCEDURE — 94760 N-INVAS EAR/PLS OXIMETRY 1: CPT

## 2022-02-11 PROCEDURE — 80053 COMPREHEN METABOLIC PANEL: CPT

## 2022-02-11 PROCEDURE — 700102 HCHG RX REV CODE 250 W/ 637 OVERRIDE(OP): Performed by: EMERGENCY MEDICINE

## 2022-02-11 PROCEDURE — 83735 ASSAY OF MAGNESIUM: CPT

## 2022-02-11 PROCEDURE — 84443 ASSAY THYROID STIM HORMONE: CPT

## 2022-02-11 PROCEDURE — 83880 ASSAY OF NATRIURETIC PEPTIDE: CPT

## 2022-02-11 PROCEDURE — A9270 NON-COVERED ITEM OR SERVICE: HCPCS | Performed by: EMERGENCY MEDICINE

## 2022-02-11 PROCEDURE — 71045 X-RAY EXAM CHEST 1 VIEW: CPT

## 2022-02-11 PROCEDURE — 85025 COMPLETE CBC W/AUTO DIFF WBC: CPT

## 2022-02-11 RX ORDER — POTASSIUM CHLORIDE 20 MEQ/1
40 TABLET, EXTENDED RELEASE ORAL ONCE
Status: COMPLETED | OUTPATIENT
Start: 2022-02-11 | End: 2022-02-11

## 2022-02-11 RX ADMIN — POTASSIUM CHLORIDE 40 MEQ: 1500 TABLET, EXTENDED RELEASE ORAL at 02:32

## 2022-02-11 ASSESSMENT — PAIN DESCRIPTION - DESCRIPTORS: DESCRIPTORS: OTHER (COMMENT)

## 2022-02-11 NOTE — ED PROVIDER NOTES
ED Provider Note    ED Provider Note    Scribed for Jesus Leung MD by Jesus Leung M.D.. 2/11/2022, 2:06 AM.    Primary care provider: Terrance Herrera M.D.  Means of arrival: Private  History obtained from: Patient  History limited by: None    CHIEF COMPLAINT  Chief Complaint   Patient presents with   • Chest Pain     Midsternal CP X2HRS       HPI  Vincent Lara is a 51 y.o. male who presents to the Emergency Department for evaluation of sensation of palpitations.  Symptoms starting about 2 hours prior to arrival.  Denies chest pain but relates to take this to the center of the chest.  He has no discomfort to the arms, no dyspnea, no vomiting, no fever.  He had similar but more mild symptoms in the past but these not been assessed.  Found to be in atrial fibrillation initially, this resolved while he is in the waiting room.    REVIEW OF SYSTEMS  Pertinent positives include palpitations, chest tightness. Pertinent negatives include no exertional component, no dyspnea, no syncope, no diaphoresis, no vomiting.  All other systems reviewed and negative.    PAST MEDICAL HISTORY   has a past medical history of Heart burn and Psychiatric problem.    SURGICAL HISTORY   has a past surgical history that includes other (2012) and inguinal hernia repair robotic xi (Bilateral, 1/6/2020).    SOCIAL HISTORY  Social History     Tobacco Use   • Smoking status: Never Smoker   • Smokeless tobacco: Never Used   Vaping Use   • Vaping Use: Never used   Substance Use Topics   • Alcohol use: Yes     Comment: wine or beer    • Drug use: No      Social History     Substance and Sexual Activity   Drug Use No       FAMILY HISTORY  No established history of coronary artery disease in the immediate family    CURRENT MEDICATIONS  Home Medications     Reviewed by Jeanne Osullivan R.N. (Registered Nurse) on 02/11/22 at 0106  Med List Status: Not Addressed   Medication Last Dose Status   citalopram (CELEXA)  "20 MG Tab 2/10/2022 Active                ALLERGIES  No Known Allergies    PHYSICAL EXAM  VITAL SIGNS: /70   Pulse 77   Temp 36.7 °C (98 °F) (Temporal)   Resp 15   Ht 1.768 m (5' 9.6\")   Wt 97.8 kg (215 lb 9.8 oz)   SpO2 94%   BMI 31.29 kg/m²     General: Alert, no acute distress  Skin: Warm, dry, normal for ethnicity  Head: Normocephalic, atraumatic  Neck: Trachea midline, no tenderness  Eye: PERRL, normal conjunctiva  ENMT: Oral mucosa moist, no pharyngeal erythema or exudate  Cardiovascular: Regular rate and rhythm, No murmur, Normal peripheral perfusion  Respiratory: Lungs CTA, respirations are non-labored, breath sounds are equal  Gastrointestinal: Soft, nontender, non distended  Musculoskeletal: No swelling, no deformity  Neurological: Alert and oriented to person, place, time, and situation  Lymphatics: No lymphadenopathy  Psychiatric: Cooperative, appropriate mood & affect      DIAGNOSTIC STUDIES/PROCEDURES    LABS  Results for orders placed or performed during the hospital encounter of 02/11/22   CBC with Differential   Result Value Ref Range    WBC 6.7 4.8 - 10.8 K/uL    RBC 4.80 4.70 - 6.10 M/uL    Hemoglobin 16.0 14.0 - 18.0 g/dL    Hematocrit 44.8 42.0 - 52.0 %    MCV 93.3 81.4 - 97.8 fL    MCH 33.3 (H) 27.0 - 33.0 pg    MCHC 35.7 (H) 33.7 - 35.3 g/dL    RDW 41.3 35.9 - 50.0 fL    Platelet Count 231 164 - 446 K/uL    MPV 8.5 (L) 9.0 - 12.9 fL    Neutrophils-Polys 48.00 44.00 - 72.00 %    Lymphocytes 37.00 22.00 - 41.00 %    Monocytes 8.20 0.00 - 13.40 %    Eosinophils 6.10 0.00 - 6.90 %    Basophils 0.40 0.00 - 1.80 %    Immature Granulocytes 0.30 0.00 - 0.90 %    Nucleated RBC 0.00 /100 WBC    Neutrophils (Absolute) 3.19 1.82 - 7.42 K/uL    Lymphs (Absolute) 2.47 1.00 - 4.80 K/uL    Monos (Absolute) 0.55 0.00 - 0.85 K/uL    Eos (Absolute) 0.41 0.00 - 0.51 K/uL    Baso (Absolute) 0.03 0.00 - 0.12 K/uL    Immature Granulocytes (abs) 0.02 0.00 - 0.11 K/uL    NRBC (Absolute) 0.00 K/uL "   Complete Metabolic Panel (CMP)   Result Value Ref Range    Sodium 137 135 - 145 mmol/L    Potassium 3.5 (L) 3.6 - 5.5 mmol/L    Chloride 104 96 - 112 mmol/L    Co2 21 20 - 33 mmol/L    Anion Gap 12.0 7.0 - 16.0    Glucose 112 (H) 65 - 99 mg/dL    Bun 19 8 - 22 mg/dL    Creatinine 0.86 0.50 - 1.40 mg/dL    Calcium 8.8 8.4 - 10.2 mg/dL    AST(SGOT) 20 12 - 45 U/L    ALT(SGPT) 18 2 - 50 U/L    Alkaline Phosphatase 52 30 - 99 U/L    Total Bilirubin 0.3 0.1 - 1.5 mg/dL    Albumin 4.0 3.2 - 4.9 g/dL    Total Protein 6.5 6.0 - 8.2 g/dL    Globulin 2.5 1.9 - 3.5 g/dL    A-G Ratio 1.6 g/dL   Troponin   Result Value Ref Range    Troponin T <6 6 - 19 ng/L   Magnesium   Result Value Ref Range    Magnesium 2.0 1.5 - 2.5 mg/dL   PT/INR   Result Value Ref Range    PT 12.3 12.0 - 14.6 sec    INR 0.99 0.87 - 1.13   APTT   Result Value Ref Range    APTT 27.9 24.7 - 36.0 sec   TSH (for screening thyroid dysfunction)   Result Value Ref Range    TSH 3.620 0.380 - 5.330 uIU/mL   proBrain Natriuretic Peptide, NT   Result Value Ref Range    NT-proBNP 37 0 - 125 pg/mL   ESTIMATED GFR   Result Value Ref Range    GFR If African American >60 >60 mL/min/1.73 m 2    GFR If Non African American >60 >60 mL/min/1.73 m 2     All labs reviewed by me, mild hypokalemia, otherwise unremarkable.    EKG  12 Lead EKG obtained at 0111 and interpreted by me to show:  Rhythm: Atrial fibrillation with rapid ventricular response  Rate: 130  Axis: Normal  Intervals: Normal  Q Waves: Normal  No diagnostic ST segment elevation    Clinical Impression: Normal EKG  Compared to none available    RADIOLOGY  DX-CHEST-PORTABLE (1 VIEW)   Final Result         1.  No acute cardiopulmonary disease.        The radiologist's interpretation of all radiological studies have been reviewed by me.    COURSE & MEDICAL DECISION MAKING  Pertinent Labs & Imaging studies reviewed. (See chart for details)    2:06 AM - Patient seen and examined at bedside.  Normal sinus rhythm  "currently on the monitor with heart rate of 81 appreciated. Ordered cardiac work-up to evaluate his symptoms. The differential diagnoses include but are not limited to: Electrolyte disturbance, ACS, atrial fibrillation    0313: Patient reassessed, feeling well and in no acute distress.  Normal sinus rhythm with rate of 88 noted on the monitor.    Patient Vitals for the past 24 hrs:   BP Temp Temp src Pulse Resp SpO2 Height Weight   02/11/22 0311 109/70 36.7 °C (98 °F) Temporal 77 15 94 % -- --   02/11/22 0241 100/76 -- -- 82 16 93 % -- --   02/11/22 0214 105/70 -- -- 86 19 93 % -- --   02/11/22 0141 (!) 95/65 36.7 °C (98.1 °F) Temporal 81 17 93 % -- --   02/11/22 0100 -- -- -- -- -- -- 1.768 m (5' 9.6\") 97.8 kg (215 lb 9.8 oz)         Decision Making:  This is a 51 y.o. year old male who presents with brief sensation of chest tightness with palpitations.  He is on no dyspnea, no exertional component, and has had no vomiting nor diaphoresis nor syncope.  EKG shows atrial fibrillation upon arrival but this actually resolves while the patient is in the waiting room.  He is healthy and not anticoagulated.  No established history of any cardiac dysrhythmia or ACS.  No evidence of ACS tonight, heart score is 2, low risk ratification.  EKG nonischemic and he has no sustained dysrhythmia, CHADS2 score is low, no indication for emergent anticoagulation.  Patient amenable to outpatient follow-up with cardiology.    The patient will return for new or worsening symptoms and is stable at the time of discharge.    DISPOSITION:  Patient will be discharged home in stable condition.    FOLLOW UP:  Terrance Herrera M.D.  [address]  [place] [address] [phone]    Schedule an appointment as soon as possible for a visit       Aly Michel M.D.  [address]  [address]  [facility] [address] [phone]    Schedule an appointment as soon as possible for a visit         OUTPATIENT MEDICATIONS:  Discharge Medication List as of " 2/11/2022  3:39 AM            FINAL IMPRESSION  1. Paroxysmal atrial fibrillation (HCC)    2. Chest tightness          IJesus M.D. (Scribe), am scribing for, and in the presence of, Jesus Leung MD.    Electronically signed by: Jesus Leung M.D. (Scribe), 2/11/2022    IJesus MD personally performed the services described in this documentation, as scribed by Jesus Leung M.D. in my presence, and it is both accurate and complete    The note accurately reflects work and decisions made by me.  Jesus Leung M.D.  2/11/2022  5:11 AM

## 2022-03-09 ENCOUNTER — OFFICE VISIT (OUTPATIENT)
Dept: MEDICAL GROUP | Facility: CLINIC | Age: 52
End: 2022-03-09
Payer: COMMERCIAL

## 2022-03-09 VITALS
WEIGHT: 213.6 LBS | BODY MASS INDEX: 30.58 KG/M2 | DIASTOLIC BLOOD PRESSURE: 86 MMHG | HEART RATE: 84 BPM | HEIGHT: 70 IN | SYSTOLIC BLOOD PRESSURE: 124 MMHG | OXYGEN SATURATION: 93 %

## 2022-03-09 DIAGNOSIS — I48.0 PAROXYSMAL ATRIAL FIBRILLATION (HCC): ICD-10-CM
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
## 2022-03-09 DIAGNOSIS — F41.8 MIXED ANXIETY DEPRESSIVE DISORDER: ICD-10-CM

## 2022-03-09 PROBLEM — K40.90 INGUINAL HERNIA, RIGHT: Status: ACTIVE | Noted: 2019-11-18

## 2022-03-09 PROBLEM — I48.91 ATRIAL FIBRILLATION (HCC): Status: ACTIVE | Noted: 2022-03-09

## 2022-03-09 PROBLEM — F52.21 MALE ERECTILE DISORDER (CODE): Status: ACTIVE | Noted: 2019-11-18

## 2022-03-09 PROCEDURE — 99214 OFFICE O/P EST MOD 30 MIN: CPT | Performed by: FAMILY MEDICINE

## 2022-03-09 RX ORDER — PROPRANOLOL HYDROCHLORIDE 10 MG/1
10 TABLET ORAL
Qty: 30 TABLET | Refills: 0 | Status: SHIPPED | OUTPATIENT
Start: 2022-03-09 | End: 2022-04-29 | Stop reason: SDUPTHER

## 2022-03-09 RX ORDER — CITALOPRAM 40 MG/1
TABLET ORAL
COMMUNITY
Start: 2022-03-08 | End: 2022-03-09

## 2022-03-09 RX ORDER — CITALOPRAM HYDROBROMIDE 10 MG/1
10 TABLET ORAL DAILY
Qty: 30 TABLET | Refills: 2 | Status: SHIPPED | OUTPATIENT
Start: 2022-03-09 | End: 2022-04-29 | Stop reason: SDUPTHER

## 2022-03-09 ASSESSMENT — FIBROSIS 4 INDEX: FIB4 SCORE: 1.04

## 2022-03-09 NOTE — ASSESSMENT & PLAN NOTE
At this time is in sinus rhythm and a [id number] score by my calculation is 0.  He has an appoint with cardiology coming up later this month.  At this time I do not see any need for anticoagulation.  Will defer to cardiology however

## 2022-03-09 NOTE — PROGRESS NOTES
"Subjective:     Chief Complaint   Patient presents with   • Follow-Up     ER visit- afib   • Medication Refill     Med f/u     Vincent Lara is a 51 y.o. male here today for     Problem   Atrial Fibrillation (Hcc)    He was in the ER last month with what appeared to be a brief episode of atrial fibrillation.  It resolved spontaneously while in the waiting room.  He states he may have had some episodes since then where his smart watch picks up \"funny heart rhythms\" he is not sure that movement or actually reflects atrial fibrillation.  He has had no symptoms suggesting A. fib.  Of note is really cut back on his alcohol consumption as well.     Activities Involving Scuba Diving    [place] some [place]uba diving.  He states in the past propranolol really helped limit any anxiety he was having.  He would like to take propranolol again for this activity.     Inguinal Hernia, Right   Male Erectile Disorder (Code)   Arthralgia of Right Knee   Preventative Health Care   Depression With Anxiety    Feels that things are going really well. States a good \"reset on the marriage\". Also working with therapist, exercising and using CBD. Really cut back on alcohol. Was 3 - 5 drinks per night. Now about 4 - 5 perweek.  Would like to wean down and off citalopram. Now on 20mg daily (half of a 40mg).            Current medicines (including changes today)  Current Outpatient Medications   Medication Sig Dispense Refill   • citalopram (CELEXA) 10 MG tablet Take 1 Tablet by mouth every day. 30 Tablet 2   • propranolol (INDERAL) 10 MG Tab Take 1 Tablet by mouth 1 time a day as needed (scuba diving). 30 Tablet 0     No current facility-administered medications for this visit.       Social History     Tobacco Use   • Smoking status: Never Smoker   • Smokeless tobacco: Never Used   Vaping Use   • Vaping Use: Never used   Substance Use Topics   • Alcohol use: Yes     Comment: wine or beer    • " "Drug use: No     Past Medical History:   Diagnosis Date   • Heart burn    • Psychiatric problem     depression      History reviewed. No pertinent family history.      Objective:     Vitals:    03/09/22 1054   BP: 124/86   BP Location: Right arm   Patient Position: Sitting   BP Cuff Size: Adult   Pulse: 84   SpO2: 93%   Weight: 96.9 kg (213 lb 9.6 oz)   Height: 1.778 m (5' 10\")     Body mass index is 30.65 kg/m².     Physical Exam:   Gen: Well developed, well nourished in no acute distress.   Skin: Pink, warm, and dry  HEENT: conjunctiva non-injected, sclera non-icteric. EOMs intact.   Nasal mucosa without edema nor erythema.   Pinna normal.    Oral mucous membranes pink and moist with no lesions.  Neck: Supple, trachea midline. No adenopathy or masses in the neck or supraclavicular regions.  Lungs: Effort is normal. Clear to auscultation bilaterally with good excursion.  LARGE BRUISE LEFT BACK, RESOLVING  CV: regular rate and rhythm, no murmurs  Abdomen: soft, nontender, + BS. No HSM.  No CVAT  Ext: no edema, color normal, vascularity normal, temperature normal  Alert and oriented Eye contact is good, speech goal directed, affect calm    Assessment and Plan:   Depression with anxiety  Support weaning citalopram. Stressed need to continue with therapist. Warned of depression recurrence.       Atrial fibrillation (HCC)  At this time is in sinus rhythm and a [id number] score by my calculation is 0.  He has an appoint with cardiology coming up later this month.  At this time I do not see any need for anticoagulation.  Will defer to cardiology however    Activities involving scuba diving  Will prescribe 10mg propranolol.  May take two.   Recommend not going deeper than 40 feet not going into any buildings cage or other places you get trapped.  Recommend taking the propranolol before he goes diving at least once to make sure he has no untoward side effects.  He understands these recommendations.    Bruise  -he reports " he fell out of a folding chair that collapsed while watching his son mountain bike race.  I believe this is a bruise that is healing appropriately.  No signs of pneumothorax or fractured rib.    Followup: Return if symptoms worsen or fail to improve.    Terrance Herrera M.D.

## 2022-03-09 NOTE — ASSESSMENT & PLAN NOTE
Will prescribe 10mg propranolol.  May take two.   Recommend not going deeper than 40 feet not going into any buildings cage or other places you get trapped.  Recommend taking the propranolol before he goes diving at least once to make sure he has no untoward side effects.  He understands these recommendations.

## 2022-03-09 NOTE — ASSESSMENT & PLAN NOTE
Support weaning citalopram. Stressed need to continue with therapist. Warned of depression recurrence.

## 2022-03-18 ENCOUNTER — TELEPHONE (OUTPATIENT)
Dept: CARDIOLOGY | Facility: MEDICAL CENTER | Age: 52
End: 2022-03-18

## 2022-03-18 NOTE — TELEPHONE ENCOUNTER
Chart Prep  LVM for patient to CB in regards to requesting pertinent outside records for NP appt with Dr. Talbert. Primarily records pertaining to prior cardiologist, outside cardiac testing/imaging and when/where the patient last had labs done. LVM to CB @[phone].

## 2022-03-21 ENCOUNTER — OFFICE VISIT (OUTPATIENT)
Dept: CARDIOLOGY | Facility: MEDICAL CENTER | Age: 52
End: 2022-03-21
Attending: EMERGENCY MEDICINE
Payer: COMMERCIAL

## 2022-03-21 VITALS
HEART RATE: 73 BPM | RESPIRATION RATE: 18 BRPM | WEIGHT: 211 LBS | DIASTOLIC BLOOD PRESSURE: 70 MMHG | SYSTOLIC BLOOD PRESSURE: 112 MMHG | BODY MASS INDEX: 30.21 KG/M2 | HEIGHT: 70 IN | OXYGEN SATURATION: 96 %

## 2022-03-21 DIAGNOSIS — I48.0 PAROXYSMAL ATRIAL FIBRILLATION (HCC): Primary | ICD-10-CM

## 2022-03-21 DIAGNOSIS — G47.30 SLEEP APNEA, UNSPECIFIED TYPE: ICD-10-CM

## 2022-03-21 LAB — EKG IMPRESSION: NORMAL

## 2022-03-21 PROCEDURE — 99244 OFF/OP CNSLTJ NEW/EST MOD 40: CPT | Performed by: INTERNAL MEDICINE

## 2022-03-21 PROCEDURE — 93000 ELECTROCARDIOGRAM COMPLETE: CPT | Performed by: INTERNAL MEDICINE

## 2022-03-21 ASSESSMENT — ENCOUNTER SYMPTOMS
NIGHT SWEATS: 0
NUMBNESS: 0
PARESTHESIAS: 0
FLANK PAIN: 0
EXCESSIVE DAYTIME SLEEPINESS: 0
NEAR-SYNCOPE: 0
DYSPNEA ON EXERTION: 0
DECREASED APPETITE: 0
FALLS: 0
DOUBLE VISION: 0
BACK PAIN: 0
DIAPHORESIS: 0
SLEEP DISTURBANCES DUE TO BREATHING: 0
SORE THROAT: 0
SHORTNESS OF BREATH: 0
SYNCOPE: 0
PALPITATIONS: 1
VOMITING: 0
IRREGULAR HEARTBEAT: 0
COUGH: 0
DIZZINESS: 0
LIGHT-HEADEDNESS: 0
DIARRHEA: 0
BLOATING: 0
CONSTIPATION: 0
NAUSEA: 0
PND: 0
LOSS OF BALANCE: 0
ORTHOPNEA: 0
HEADACHES: 0
FEVER: 0
MYALGIAS: 0
WEAKNESS: 0
NERVOUS/ANXIOUS: 1
BLURRED VISION: 0
WHEEZING: 0

## 2022-03-21 ASSESSMENT — FIBROSIS 4 INDEX: FIB4 SCORE: 1.04

## 2022-03-21 NOTE — PROGRESS NOTES
Cardiology Initial Consultation Note    Date of note:    3/21/2022    Primary Care Provider: Terrance Herrera M.D.  Referring Provider: Jesus Leung,*     Patient Name: Vincent Lara   YOB: 1970  MRN:              [id number]    Chief Complaint   Patient presents with   • Atrial Fibrillation       History of Present Illness: Mr. Vincent Lara is a 51 y.o. male whose current medical problems include paroxysmal atrial fibrillation who is here for cardiac consultation for above.  Patient is accompanied by his wife.    Patient states that he was in his usual state of health until 2/11/2022 when he presented to the emergency department with palpitations.  Was found to be in new onset atrial fibrillation with RVR.  He self converted into sinus rhythm and was discharged home to follow-up with cardiology.  No episodes of A. fib since then.    In terms of physical activity, is active and exercises on a regular basis with biking and yoga.    Cardiovascular Risk Factors:  1. Smoking status: Never smoker  2. Type II Diabetes Mellitus: No  3. Hypertension: No  4. Dyslipidemia: No  5. Family history of early Coronary Artery Disease in a first degree relative (Male less than 55 years of age; Female less than 65 years of age): Denies  6.  Obesity and/or Metabolic Syndrome: BMI 30  7. Sedentary lifestyle: No    Review of Systems   Constitutional: Negative for decreased appetite, diaphoresis, fever, malaise/fatigue and night sweats.   HENT: Negative for congestion and sore throat.    Eyes: Negative for blurred vision and double vision.   Cardiovascular: Positive for palpitations. Negative for chest pain, cyanosis, dyspnea on exertion, irregular heartbeat, leg swelling, near-syncope, orthopnea, paroxysmal nocturnal dyspnea and syncope.   Respiratory: Negative for cough, shortness of breath, sleep disturbances due to breathing and wheezing.    Endocrine: Negative for cold intolerance and heat  intolerance.   Musculoskeletal: Negative for back pain, falls and myalgias.   Gastrointestinal: Negative for bloating, constipation, diarrhea, nausea and vomiting.   Genitourinary: Negative for dysuria and flank pain.   Neurological: Negative for excessive daytime sleepiness, dizziness, headaches, light-headedness, loss of balance, numbness, paresthesias and weakness.   Psychiatric/Behavioral: The patient is nervous/anxious.        Past Medical History:   Diagnosis Date   • Heart burn    • Psychiatric problem     depression         Past Surgical History:   Procedure Laterality Date   • INGUINAL HERNIA REPAIR ROBOTIC XI Bilateral 1/6/2020    Procedure: REPAIR, HERNIA, INGUINAL, ROBOT-ASSISTED, USING DA LENNOX XI-WITH MESH;  Surgeon: Randal Burnett M.D.;  Location: SURGERY [facility];  Service: General   • OTHER  2012    Plunging ranula fixed (blocked duct)         Current Outpatient Medications   Medication Sig Dispense Refill   • citalopram (CELEXA) 10 MG tablet Take 1 Tablet by mouth every day. 30 Tablet 2   • propranolol (INDERAL) 10 MG Tab Take 1 Tablet by mouth 1 time a day as needed (scuba diving). 30 Tablet 0     No current facility-administered medications for this visit.         No Known Allergies      History reviewed. No pertinent family history.      Social History     Socioeconomic History   • Marital status:      Spouse name: Not on file   • Number of children: Not on file   • Years of education: Not on file   • Highest education level: Not on file   Occupational History   • Not on file   Tobacco Use   • Smoking status: Never Smoker   • Smokeless tobacco: Never Used   Vaping Use   • Vaping Use: Never used   Substance and Sexual Activity   • Alcohol use: Yes     Alcohol/week: 4.2 oz     Types: 7 Standard drinks or equivalent per week     Comment: wine or beer, one a day   • Drug use: No   • Sexual activity: Not on file   Other Topics Concern   • Not on file   Social History Narrative   • Not  "on file     Social Determinants of Health     Financial Resource Strain: Not on file   Food Insecurity: Not on file   Transportation Needs: Not on file   Physical Activity: Not on file   Stress: Not on file   Social Connections: Not on file   Intimate Partner Violence: Not on file   Housing Stability: Not on file         Physical Exam:  Ambulatory Vitals  /70 (BP Location: Left arm, Patient Position: Sitting, BP Cuff Size: Adult)   Pulse 73   Resp 18   Ht 1.778 m (5' 10\")   Wt 95.7 kg (211 lb)   SpO2 96%    Oxygen Therapy:  Pulse Oximetry: 96 %  BP Readings from Last 4 Encounters:   03/21/22 112/70   03/09/22 124/86   02/11/22 109/70   01/06/20 114/64       Weight/BMI: Body mass index is 30.28 kg/m².  Wt Readings from Last 4 Encounters:   03/21/22 95.7 kg (211 lb)   03/09/22 96.9 kg (213 lb 9.6 oz)   02/11/22 97.8 kg (215 lb 9.8 oz)   01/06/20 95.3 kg (210 lb 1.6 oz)         General: Well appearing and in no apparent distress  Eyes: nl conjunctiva, no icteric sclera  ENT: wearing a mask, normal external appearance of ears  Neck: no visible JVP,  no carotid bruits  Lungs: normal respiratory effort, CTAB  Heart: RRR, no murmurs, no rubs or gallops,  no edema bilateral lower extremities. No LV/RV heave on cardiac palpatation. 2+ bilateral radial pulses.  2+ bilateral dp pulses.   Abdomen: soft, non tender, non distended, no masses, normal bowel sounds.  No HSM.  Extremities/MSK: no clubbing, no cyanosis  Neurological: No focal sensory deficits  Psychiatric: Appropriate affect, A/O x 3, intact judgement and insight  Skin: Warm extremities      Lab Data Review:      Lab Results   Component Value Date/Time    SODIUM 137 02/11/2022 01:20 AM    POTASSIUM 3.5 (L) 02/11/2022 01:20 AM    CHLORIDE 104 02/11/2022 01:20 AM    CO2 21 02/11/2022 01:20 AM    GLUCOSE 112 (H) 02/11/2022 01:20 AM    BUN 19 02/11/2022 01:20 AM    CREATININE 0.86 02/11/2022 01:20 AM     Lab Results   Component Value Date/Time    ALKPHOSPHAT 52 " 02/11/2022 01:20 AM    ASTSGOT 20 02/11/2022 01:20 AM    ALTSGPT 18 02/11/2022 01:20 AM    TBILIRUBIN 0.3 02/11/2022 01:20 AM      Lab Results   Component Value Date/Time    WBC 6.7 02/11/2022 01:20 AM         Cardiac Imaging and Procedures Review:    EKG dated 3/21/2022: My personal interpretation is sinus rhythm    EKG dated 2/11/2022:   My first interpretation is atrial fibrillation with RVR      Radiology test Review:  CXR: No acute cardiopulmonary abnormality noted.      Assessment & Plan     1. Paroxysmal atrial fibrillation (HCC)  EKG    EC-ECHOCARDIOGRAM COMPLETE W/O CONT    NM-CARDIAC STRESS TEST    OVERNIGHT PULSE OXIMETRY   2. Sleep apnea, unspecified type  OVERNIGHT PULSE OXIMETRY         Shared Medical Decision Making:  Patient's [id number] score is 0.  No indication to initiate on anticoagulation at this time.    Obtain transthoracic echocardiogram to evaluate underlying cardiac structure and function.  Rule out structural or valvular heart abnormality given new onset atrial fibrillation.    Obtain treadmill nuclear cardiac stress test to rule out obstructive CAD prior to initiating flecainide as a pill in the pocket approach.    Obtain overnight pulse oximeter to rule out sleep apnea.  If abnormal, referral to sleep medicine for sleep study and CPAP consideration which has been d/w the patient.      All of patient and his wife's excellent questions were answered to the best of my knowledge and to their satisfaction.  It was a pleasure seeing Mr. Vincent Lara in my clinic today. Return in about 6 months (around 9/21/2022). Patient is aware to call the cardiology clinic with any questions or concerns.      Elmer Talbert MD  [facility]  [facility], [address]  [address] [address]  Phone: [phone]  Fax: [phone]    Please note that this dictation was created using voice recognition software. I have made every  reasonable attempt to correct obvious errors, but it is possible there are errors of grammar and possibly content that I did not discover before finalizing the note.

## 2022-03-21 NOTE — TELEPHONE ENCOUNTER
Pt is calling back to let us know he has not had any testing, labs or imaging done and has not seen a Cardiologist at all in the past.     Thank You  Gely GROSS

## 2022-03-22 ENCOUNTER — HOSPITAL ENCOUNTER (OUTPATIENT)
Dept: RADIOLOGY | Facility: MEDICAL CENTER | Age: 52
End: 2022-03-22
Attending: INTERNAL MEDICINE
Payer: COMMERCIAL

## 2022-03-22 DIAGNOSIS — I48.0 PAROXYSMAL ATRIAL FIBRILLATION (HCC): ICD-10-CM

## 2022-03-22 PROCEDURE — A9502 TC99M TETROFOSMIN: HCPCS

## 2022-03-23 PROCEDURE — 93018 CV STRESS TEST I&R ONLY: CPT | Performed by: INTERNAL MEDICINE

## 2022-03-23 PROCEDURE — 78452 HT MUSCLE IMAGE SPECT MULT: CPT | Mod: 26 | Performed by: INTERNAL MEDICINE

## 2022-03-30 ENCOUNTER — HOSPITAL ENCOUNTER (OUTPATIENT)
Dept: CARDIOLOGY | Facility: MEDICAL CENTER | Age: 52
End: 2022-03-30
Attending: INTERNAL MEDICINE
Payer: COMMERCIAL

## 2022-03-30 DIAGNOSIS — I48.0 PAROXYSMAL ATRIAL FIBRILLATION (HCC): ICD-10-CM

## 2022-03-30 PROCEDURE — 93306 TTE W/DOPPLER COMPLETE: CPT

## 2022-04-05 PROCEDURE — 93306 TTE W/DOPPLER COMPLETE: CPT | Mod: 26 | Performed by: INTERNAL MEDICINE

## 2022-04-07 ENCOUNTER — TELEPHONE (OUTPATIENT)
Dept: CARDIOLOGY | Facility: MEDICAL CENTER | Age: 52
End: 2022-04-07

## 2022-04-07 NOTE — TELEPHONE ENCOUNTER
DA    Pt called to schedule an overnight pulse oximetry, but the order has been closed. Please send a new order.    Thank you

## 2022-04-11 NOTE — TELEPHONE ENCOUNTER
Faxed orders to [phone]  Confirmation status received  Scan to [facility]

## 2022-04-12 ENCOUNTER — TELEPHONE (OUTPATIENT)
Dept: CARDIOLOGY | Facility: MEDICAL CENTER | Age: 52
End: 2022-04-12

## 2022-04-12 LAB
LV EJECT FRACT  99904: 55
LV EJECT FRACT MOD 2C 99903: 49.84
LV EJECT FRACT MOD 4C 99902: 56.87
LV EJECT FRACT MOD BP 99901: 52.12

## 2022-04-12 NOTE — TELEPHONE ENCOUNTER
----- Message from Elmer Talbert M.D. sent at 4/12/2022  9:37 AM PDT -----  Please let the patient know that the echocardiogram is overall normal with mild aortic insufficiency.  No change to POC.  Thanks.

## 2022-04-29 DIAGNOSIS — F41.8 MIXED ANXIETY DEPRESSIVE DISORDER: ICD-10-CM
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
## 2022-04-29 NOTE — TELEPHONE ENCOUNTER
Received request via: Pharmacy    Was the patient seen in the last year in this department? Yes, 3/9/22    Does the patient have an active prescription (recently filled or refills available) for medication(s) requested? No

## 2022-05-04 RX ORDER — PROPRANOLOL HYDROCHLORIDE 10 MG/1
10 TABLET ORAL
Qty: 90 TABLET | Refills: 0 | Status: SHIPPED | OUTPATIENT
Start: 2022-05-04 | End: 2022-10-14 | Stop reason: SDUPTHER

## 2022-05-04 RX ORDER — CITALOPRAM HYDROBROMIDE 10 MG/1
10 TABLET ORAL DAILY
Qty: 90 TABLET | Refills: 2 | Status: SHIPPED | OUTPATIENT
Start: 2022-05-04 | End: 2022-07-25 | Stop reason: SDUPTHER

## 2022-05-09 ENCOUNTER — TELEPHONE (OUTPATIENT)
Dept: CARDIOLOGY | Facility: MEDICAL CENTER | Age: 52
End: 2022-05-09

## 2022-05-09 DIAGNOSIS — R79.81 ABNORMAL PULSE OXIMETRY: ICD-10-CM

## 2022-05-09 NOTE — TELEPHONE ENCOUNTER
Discussed OPO results with pt. Agreeable to referral. Will call [phone] number if have not heard from sleep med team in 7 business days

## 2022-07-05 ENCOUNTER — OFFICE VISIT (OUTPATIENT)
Dept: MEDICAL GROUP | Facility: CLINIC | Age: 52
End: 2022-07-05
Payer: COMMERCIAL

## 2022-07-05 VITALS
DIASTOLIC BLOOD PRESSURE: 86 MMHG | BODY MASS INDEX: 29.26 KG/M2 | WEIGHT: 209 LBS | HEIGHT: 71 IN | HEART RATE: 65 BPM | OXYGEN SATURATION: 94 % | SYSTOLIC BLOOD PRESSURE: 128 MMHG

## 2022-07-05 DIAGNOSIS — R07.89 CHEST TIGHTNESS: ICD-10-CM

## 2022-07-05 DIAGNOSIS — Z13.79 GENETIC SCREENING: ICD-10-CM

## 2022-07-05 PROCEDURE — 99214 OFFICE O/P EST MOD 30 MIN: CPT | Mod: GC | Performed by: STUDENT IN AN ORGANIZED HEALTH CARE EDUCATION/TRAINING PROGRAM

## 2022-07-05 ASSESSMENT — FIBROSIS 4 INDEX: FIB4 SCORE: 1.06

## 2022-07-05 NOTE — PATIENT INSTRUCTIONS
Please undergo troponin blood testing tonight. If the result is negative, then you can undergo EKG at [facility]. If the troponin is elevated, please go to the ER for additional evaluation and management.

## 2022-07-05 NOTE — PROGRESS NOTES
"Subjective:     CC: Exertional shortness of breath, chest tightness    HPI:   Vincent presents today with    Exertional shortness of breath-   The patient reported that he was initially diagnosed with COVID-19 on 6/12/2022.  Since then, he has been gradually feeling better as he has not had any typical viral symptoms aside from the persistent chest tightness and shortness of breath with exertion.  Every day since 6/20/2022, he has been feeling progressively short of breath with chest tightness daily.  He said that his wife is a nurse practitioner and he monitors his oxygen at home.  His oxygen at home has been \"in the low 90s.\"  Prior to his COVID diagnosis, he would camp and go mountain biking without any issues.  Recently, he had to sit down after walking his dogs approximately 600 feet.  Additionally he was not able to tolerate elevated altitude.  He has no longer mountain biking.  He reports a dry cough, otherwise, he occasionally will see clear sputum.  He denies fever, chills, and night sweats now.  He denies any unexpected weight loss.      Chest tightness-  He reports a known history of anxiety which often occurs with chest tightness.  The chest tightness now is currently prolonged and worse compared to his previous anxiety attacks.  The chest tightness remains at the center of his chest.  The chest tightness is present during the current visit.  He denies a history of myocardial infarction, peripheral artery disease, and stroke.  He denies a smoking history.  Family history significant for hypertension in his mother.    Current Outpatient Medications Ordered in Epic   Medication Sig Dispense Refill   • citalopram (CELEXA) 10 MG tablet Take 1 Tablet by mouth every day. 90 Tablet 2   • propranolol (INDERAL) 10 MG Tab Take 1 Tablet by mouth 1 time a day as needed (scuba diving). 90 Tablet 0     No current Epic-ordered facility-administered medications on file.       Health Maintenance:   Colonoscopy: Y, 2021, " "few polyps present. F/U in 3 years per GI.  Alcohol: 1-2 beers at night, most nights    Hypertension: N  Prediabetes: Unknown  Genetic Screening: Y    ROS negative unless stated in HPI.    Objective:     Exam:  /86 (BP Location: Right arm, Patient Position: Sitting, BP Cuff Size: Adult)   Pulse 65   Ht 1.803 m (5' 11\")   Wt 94.8 kg (209 lb)   SpO2 94%   BMI 29.15 kg/m²  Body mass index is 29.15 kg/m².    Physical Exam   General, awake, alert, oriented and answering questions appropriately.  Neck: No JVD   Cardiovascular: Regular rate and rhythm, no murmurs, rubs, or gallops.  Respiratory: No increased work of breathing.  Speaking in full and complete sentences.  CTAB.  Abdomen: Soft, nontender, nondistended positive, bowel sounds present  Lower extremities: No edema present    Labs:   Results for orders placed or performed during the hospital encounter of 03/30/22   EC-ECHOCARDIOGRAM COMPLETE W/O CONT   Result Value Ref Range    Eject.Frac. MOD BP 52.12     Eject.Frac. MOD 4C 56.87     Eject.Frac. MOD 2C 49.84     Left Ventrical Ejection Fraction 55        Assessment & Plan:     52 y.o. 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  -In the event his troponin is elevated.  The patient is encouraged to go to the emergency department.  The troponin is not elevated, the patient is encouraged to get an outpatient EKG.  - Seeing as we are unable to complete PFTs in the clinic today, the patient is encouraged to undergo PFT to assess for possible lung pathology following COVID-19 infection    Return in about 4 weeks (around 8/2/2022).            "

## 2022-07-06 ENCOUNTER — NON-PROVIDER VISIT (OUTPATIENT)
Dept: MEDICAL GROUP | Facility: CLINIC | Age: 52
End: 2022-07-06
Payer: COMMERCIAL

## 2022-07-06 PROCEDURE — 99999 PR NO CHARGE: CPT | Performed by: FAMILY MEDICINE

## 2022-07-06 NOTE — PROGRESS NOTES
Ellis Francisco Ellis Lara is a 52 y.o. male here for a non-provider visit for EKG    If abnormal was an in office provider notified today (if so, indicate provider)? No    Routed to PCP? Yes

## 2022-07-07 ENCOUNTER — RESEARCH ENCOUNTER (OUTPATIENT)
Dept: RESEARCH | Facility: WORKSITE | Age: 52
End: 2022-07-07
Payer: COMMERCIAL

## 2022-07-07 DIAGNOSIS — Z00.6 RESEARCH STUDY PATIENT: Primary | ICD-10-CM

## 2022-07-25 DIAGNOSIS — F41.8 MIXED ANXIETY DEPRESSIVE DISORDER: ICD-10-CM

## 2022-07-26 RX ORDER — CITALOPRAM HYDROBROMIDE 10 MG/1
10 TABLET ORAL DAILY
Qty: 90 TABLET | Refills: 2 | Status: SHIPPED | OUTPATIENT
Start: 2022-07-26 | End: 2022-10-21

## 2022-08-11 LAB
APOB+LDLR+PCSK9 GENE MUT ANL BLD/T: NOT DETECTED
BRCA1+BRCA2 DEL+DUP + FULL MUT ANL BLD/T: NOT DETECTED
MLH1+MSH2+MSH6+PMS2 GN DEL+DUP+FUL M: NOT DETECTED

## 2022-08-24 ENCOUNTER — NON-PROVIDER VISIT (OUTPATIENT)
Dept: SLEEP MEDICINE | Facility: MEDICAL CENTER | Age: 52
End: 2022-08-24
Attending: STUDENT IN AN ORGANIZED HEALTH CARE EDUCATION/TRAINING PROGRAM
Payer: COMMERCIAL

## 2022-08-24 VITALS — HEIGHT: 70 IN | WEIGHT: 205 LBS | BODY MASS INDEX: 29.35 KG/M2

## 2022-08-24 DIAGNOSIS — R07.89 CHEST TIGHTNESS: ICD-10-CM

## 2022-08-24 PROCEDURE — 94726 PLETHYSMOGRAPHY LUNG VOLUMES: CPT | Performed by: INTERNAL MEDICINE

## 2022-08-24 PROCEDURE — 94060 EVALUATION OF WHEEZING: CPT | Performed by: INTERNAL MEDICINE

## 2022-08-24 PROCEDURE — 94729 DIFFUSING CAPACITY: CPT | Performed by: INTERNAL MEDICINE

## 2022-08-24 ASSESSMENT — PULMONARY FUNCTION TESTS
FEV1/FVC_PERCENT_PREDICTED: 106
FEV1/FVC_PREDICTED: 79
FEV1_PERCENT_CHANGE: 1
FVC_PERCENT_PREDICTED: 114
FEV1/FVC: 82
FEV1_PERCENT_PREDICTED: 121
FEV1: 4.7
FEV1/FVC_PERCENT_PREDICTED: 105
FEV1: 4.54
FEV1_PREDICTED: 3.87
FEV1/FVC_PERCENT_PREDICTED: 104
FEV1_PERCENT_CHANGE: 3
FVC_PREDICTED: 4.93
FVC_LLN: 4.11
FEV1/FVC_PERCENT_CHANGE: 1
FEV1/FVC: 83
FVC: 5.52
FEV1/FVC_PERCENT_LLN: 66
FEV1/FVC: 83.48
FEV1/FVC_PERCENT_PREDICTED: 78
FVC_PERCENT_PREDICTED: 112
FVC: 5.63
FEV1/FVC_PERCENT_PREDICTED: 104
FEV1_LLN: 3.23
FEV1_PERCENT_PREDICTED: 117
FEV1/FVC: 82
FEV1/FVC_PERCENT_CHANGE: 300

## 2022-08-24 ASSESSMENT — FIBROSIS 4 INDEX: FIB4 SCORE: 1.06

## 2022-08-24 NOTE — PROCEDURES
Technician: Remedios Mcnally RRT, CPFT  Good patient effort & cooperation.  The results of this test meet the ATS/ERS standards for acceptability & reproducibility.  Test was performed on the [employer] Body Plethysmograph-Elite DX system.  Predicted equations for Spirometry are GLI-2012, ITS for lung volumes, and GLI-2017 for DLCO.  The DLCO was uncorrected for Hgb.  A bronchodilator of Ventolin HFA -2puffs via spacer administered.  DLCO performed during dilation period.    Interpretation;    Baseline spirometry shows normal airflows.  No significant bronchodilator response.  Lung volumes are within normal limits.  Diffusion capacity is elevated at 138% predicted.  Normal pulmonary function testing with normal flow volume loop.

## 2022-09-05 ENCOUNTER — HOSPITAL ENCOUNTER (EMERGENCY)
Facility: MEDICAL CENTER | Age: 52
End: 2022-09-05
Attending: EMERGENCY MEDICINE
Payer: COMMERCIAL

## 2022-09-05 VITALS
OXYGEN SATURATION: 95 % | WEIGHT: 205.03 LBS | HEIGHT: 70 IN | RESPIRATION RATE: 18 BRPM | SYSTOLIC BLOOD PRESSURE: 108 MMHG | DIASTOLIC BLOOD PRESSURE: 80 MMHG | TEMPERATURE: 97.4 F | HEART RATE: 67 BPM | BODY MASS INDEX: 29.35 KG/M2

## 2022-09-05 DIAGNOSIS — S61.412A LACERATION OF LEFT HAND WITHOUT FOREIGN BODY, INITIAL ENCOUNTER: ICD-10-CM

## 2022-09-05 PROCEDURE — 304999 HCHG REPAIR-SIMPLE/INTERMED LEVEL 1

## 2022-09-05 PROCEDURE — 303747 HCHG EXTRA SUTURE

## 2022-09-05 PROCEDURE — 90715 TDAP VACCINE 7 YRS/> IM: CPT | Performed by: EMERGENCY MEDICINE

## 2022-09-05 PROCEDURE — 90471 IMMUNIZATION ADMIN: CPT

## 2022-09-05 PROCEDURE — 99282 EMERGENCY DEPT VISIT SF MDM: CPT

## 2022-09-05 PROCEDURE — 700111 HCHG RX REV CODE 636 W/ 250 OVERRIDE (IP): Performed by: EMERGENCY MEDICINE

## 2022-09-05 RX ADMIN — CLOSTRIDIUM TETANI TOXOID ANTIGEN (FORMALDEHYDE INACTIVATED), CORYNEBACTERIUM DIPHTHERIAE TOXOID ANTIGEN (FORMALDEHYDE INACTIVATED), BORDETELLA PERTUSSIS TOXOID ANTIGEN (GLUTARALDEHYDE INACTIVATED), BORDETELLA PERTUSSIS FILAMENTOUS HEMAGGLUTININ ANTIGEN (FORMALDEHYDE INACTIVATED), BORDETELLA PERTUSSIS PERTACTIN ANTIGEN, AND BORDETELLA PERTUSSIS FIMBRIAE 2/3 ANTIGEN 0.5 ML: 5; 2; 2.5; 5; 3; 5 INJECTION, SUSPENSION INTRAMUSCULAR at 15:00

## 2022-09-05 ASSESSMENT — FIBROSIS 4 INDEX: FIB4 SCORE: 1.06

## 2022-09-05 NOTE — ED PROVIDER NOTES
"ED Provider Note    CHIEF COMPLAINT  Chief Complaint   Patient presents with    Laceration     Cut to L outer hand at pinky base        HPI  Vincent Lara is a 52 y.o. male who presents with a laceration to the dorsal aspect of his left hand.  The patient inadvertently cut himself with a knife when he was attempting to cut a tangerine.  He presents with a 3 cm laceration over the dorsal aspect of the left hand.  This is just distal to the MCP joint of the fifth phalanx.  The patient does not have any loss of flexion or extension.  The patient's tetanus is not up-to-date.    REVIEW OF SYSTEMS  No other musculoskeletal complaints, no recent fevers    PHYSICAL EXAM  VITAL SIGNS: /80   Pulse 67   Temp 36.3 °C (97.4 °F) (Temporal)   Resp 18   Ht 1.778 m (5' 10\")   Wt 93 kg (205 lb 0.4 oz)   SpO2 95%   BMI 29.42 kg/m²   In general the patient does not appear toxic    Extremities patient has a 3 cm laceration over the dorsal aspect of the left fifth proximal phalanx.  This is just distal to the MCP joint.  The patient does not have any loss of flexion or extension at the MCP nor the IP joints.    Skin the 3 cm laceration described above    Neurovascular examination is grossly intact to the left hand    PROCEDURES laceration repair  Lidocaine with epinephrine was utilized for local anesthetic.  I then irrigated the wound with saline.  After irrigation I closed the wound with two 4-0 Ethilon sutures in a simple interrupted fashion.    COURSE & MEDICAL DECISION MAKING  Pertinent Labs & Imaging studies reviewed. (See chart for details)  This a 52-year-old male who presents with a laceration to the dorsal aspect of the left hand.  He does not have any loss of flexion or extension.  The patient had primary closure.  He will receive tetanus prophylaxis and he will return in 10 to 14 days for suture removal and sooner for signs of infection.    FINAL IMPRESSION  1.  3 cm laceration to the dorsum of the " left fifth proximal phalanx       Disposition  The patient will be discharged in stable condition      Electronically signed by: Luis Antonio Tovar M.D., 9/5/2022 3:41 PM

## 2022-09-05 NOTE — ED NOTES
Patient discharged home in stable condition with wife  AVS provided with recommended follow up and home care instructions and education information  Discussed wound care and signs and symptoms of infection  Patient and wife verbalized understanding  Ambulatory at time of discharge

## 2022-09-05 NOTE — ED TRIAGE NOTES
"Pt comes in w/ wife  cut to L hand by knife  last dT 2015  bleeding controlled  per pt \"I felt it hit the bone\"   "

## 2022-09-22 ENCOUNTER — APPOINTMENT (OUTPATIENT)
Dept: MEDICAL GROUP | Facility: CLINIC | Age: 52
End: 2022-09-22
Payer: COMMERCIAL

## 2022-09-29 ENCOUNTER — APPOINTMENT (OUTPATIENT)
Dept: MEDICAL GROUP | Facility: CLINIC | Age: 52
End: 2022-09-29
Payer: COMMERCIAL

## 2022-10-14 DIAGNOSIS — F41.8 MIXED ANXIETY DEPRESSIVE DISORDER: ICD-10-CM
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
## 2022-10-14 RX ORDER — PROPRANOLOL HYDROCHLORIDE 10 MG/1
10 TABLET ORAL
Qty: 90 TABLET | Refills: 0 | Status: SHIPPED | OUTPATIENT
Start: 2022-10-14

## 2022-10-21 ENCOUNTER — OFFICE VISIT (OUTPATIENT)
Dept: SLEEP MEDICINE | Facility: MEDICAL CENTER | Age: 52
End: 2022-10-21
Payer: COMMERCIAL

## 2022-10-21 VITALS
RESPIRATION RATE: 14 BRPM | SYSTOLIC BLOOD PRESSURE: 130 MMHG | OXYGEN SATURATION: 93 % | DIASTOLIC BLOOD PRESSURE: 78 MMHG | HEART RATE: 87 BPM | WEIGHT: 200 LBS | BODY MASS INDEX: 28.63 KG/M2 | HEIGHT: 70 IN

## 2022-10-21 DIAGNOSIS — G47.19 EXCESSIVE DAYTIME SLEEPINESS: ICD-10-CM

## 2022-10-21 DIAGNOSIS — G47.30 SLEEP DISORDER BREATHING: Primary | ICD-10-CM

## 2022-10-21 DIAGNOSIS — G47.34 NOCTURNAL HYPOXIA: ICD-10-CM

## 2022-10-21 PROCEDURE — 99204 OFFICE O/P NEW MOD 45 MIN: CPT | Performed by: STUDENT IN AN ORGANIZED HEALTH CARE EDUCATION/TRAINING PROGRAM

## 2022-10-21 ASSESSMENT — PATIENT HEALTH QUESTIONNAIRE - PHQ9: CLINICAL INTERPRETATION OF PHQ2 SCORE: 0

## 2022-10-21 ASSESSMENT — FIBROSIS 4 INDEX: FIB4 SCORE: 1.06

## 2022-10-21 NOTE — PROGRESS NOTES
[facility] Consult Note     Date: 10/21/2022 / Time: 10:20 AM      Thank you for requesting a sleep medicine consultation on Vincent Lara at the sleep center. Presents today with the chief complaints of snoring,  occasional excessive daytime sleepiness, nocturnal hypoxia. He is referred by Elmer Talbert M.D.  [address] [address]  [place] [id number] for evaluation and treatment of sleep disorder breathing .     HISTORY OF PRESENT ILLNESS:     Vincent Lara is a 52 y.o. male with History of depression with anxiety, history of paroxysmal atrial fibrillation, and nocturnal hypoxia.  Presents to Sleep Clinic for evaluation of sleep.     He underwent overnight pulse oximetry study in May of this year.  Study showed significant nocturnal hypoxia.  This is one of his main reasons for today's visit.    He has been dealing with low energy at times during the day.  He is sleepy at times during the day.  He has had some difficulty with concentration and memory as well as irritability.  He is retired.  His wife is told him that he does snore in his sleep.  He finds his sleep restorative about half the time.    He reports he has stopped drinking alcohol recently.  He has weaned down over the last several months and has stopped completely now.    As per supplemental questionnaire to be scanned or imported into chart:    [place] Sleepiness Score: 11    Sleep Schedule  Bedtime: Weekday 10pm Weekend 10pm  Wake time: Weekday 530am Weekend 6am  Sleep-onset latency: 20 min   Awakenings from sleep: 0-2  Difficulty falling back asleep: did but not now  Bedroom partner: Wife   Naps: No    DAYTIME SYMPTOMS:   Excessive daytime sleepiness: No   Daytime fatigue: Yes, 15%  Difficulty concentrating: Yes  Memory problems: Yes  Irritability:Yes  Work/school performance issues: No ,   Sleepiness with driving: No   Caffeine/stimulant use: Yes  Alcohol use:No     SLEEP RELATED SYMPTOMS  Snoring:  "Yes  Witnessed apnea or gasping/choking: No   Dry mouth or mouth breathing: No   Sweating: No   Teeth grinding/biting: No  has mouth garud   Morning headaches: No   Refreshed Upon Awakening: No  half      SLEEP RELATED BEHAVIORS:  Parasomnias (walking, talking, eating, violence): No   Leg kicking: No   Restless legs - \"urge to move\": No   Nightmares: No  Recurrent: No   Dream enactment: No      NARCOLEPSY:  Cataplexy: No   Sleep paralysis: No   Sleep attacks: No   Hypnagogic/hypnopompic hallucinations: No     MEDICAL HISTORY  Past Medical History:   Diagnosis Date    Heart burn     Psychiatric problem     depression        SURGICAL HISTORY  Past Surgical History:   Procedure Laterality Date    INGUINAL HERNIA REPAIR ROBOTIC XI Bilateral 1/6/2020    Procedure: REPAIR, HERNIA, INGUINAL, ROBOT-ASSISTED, USING DA [email or web address] XI-WITH MESH;  Surgeon: Randal Burnett M.D.;  Location: SURGERY [facility];  Service: General    OTHER  2012    Plunging ranula fixed (blocked duct)        FAMILY HISTORY  History reviewed. No pertinent family history.    SOCIAL HISTORY  Social History     Socioeconomic History    Marital status:    Tobacco Use    Smoking status: Never    Smokeless tobacco: Never   Vaping Use    Vaping Use: Never used   Substance and Sexual Activity    Alcohol use: Yes     Alcohol/week: 4.2 oz     Types: 7 Standard drinks or equivalent per week     Comment: wine or beer, one a day    Drug use: No        Occupation: Retired, School Admin     CURRENT MEDICATIONS  Current Outpatient Medications   Medication Sig Dispense Refill    propranolol (INDERAL) 10 MG Tab Take 1 Tablet by mouth 1 time a day as needed (scuba diving). 90 Tablet 0    citalopram (CELEXA) 10 MG tablet Take 1 Tablet by mouth every day. (Patient not taking: Reported on 10/21/2022) 90 Tablet 2     No current facility-administered medications for this visit.       REVIEW OF SYSTEMS  Constitutional: Denies fevers, Denies weight " "changes  Ears/Nose/Throat/Mouth: Denies nasal congestion or sore throat   Cardiovascular: Denies chest pain  Respiratory: Denies shortness of breath, Denies cough  Gastrointestinal/Hepatic: Denies nausea, vomiting  Sleep: see HPI    Physical Examination:  Vitals/ General Appearance:   Weight/BMI: Body mass index is 28.7 kg/m².  /78 (BP Location: Left arm, Patient Position: Sitting, BP Cuff Size: Large adult)   Pulse 87   Resp 14   Ht 1.778 m (5' 10\")   Wt 90.7 kg (200 lb)   SpO2 93%   Vitals:    10/21/22 1018   BP: 130/78   BP Location: Left arm   Patient Position: Sitting   BP Cuff Size: Large adult   Pulse: 87   Resp: 14   SpO2: 93%   Weight: 90.7 kg (200 lb)   Height: 1.778 m (5' 10\")       Pt. is alert and oriented to time, place and person. Cooperative and in no apparent distress.     Constitutional: Alert, no distress, well-groomed.  Skin: No rashes in visible areas.  Eye: Round. Conjunctiva clear, lids normal. No icterus.   ENT EXAM  Nasal alae/valves collapsible: No   Nasal septum deviation: No   Nasal turbinate hypertrophy: Left: Grade 1   Right: Grade 1  Hard palate narrow: Yes  Hard palate high: Yes  Soft palate/uvula (Mallampati score): 4, uvula elongated  Tongue Scalloping: Yes  Retrognathia: No   Micrognathia: No   Cardiovascular:no murmus/gallops/rubs, normal S1 and S2 heart sounds, regular rate and rhythm  Pulmonary:Clear to auscultation, No wheezes, No crackles.  Neurologic:Awake, alert and oriented x 3, Normal age appropriate gait, No involuntary motions.  Extremities: No clubbing, cyanosis, or edema       ASSESSMENT AND PLAN   Vincent Lara is a 52 y.o. male with History of depression with anxiety, history of paroxysmal atrial fibrillation, and nocturnal hypoxia.  Presents to Sleep Clinic for evaluation of sleep.     1. Vincent Lara  has symptoms of Obstructive Sleep Apnea (SARITA). Vincent Lara has symptoms of snoring, unrefreshing sleep, " brain fog, daytime tiredness. These  interfere with activities of daily living.   ESS 11  Pt has risk factors for SARITA include overweight, age, and crowded oropharynx.     The pathophysiology of SARITA and the increased risk of cardiovascular morbidity from untreated SARITA is discussed in detail with the patient. He  also has history of atrial fibrillation and anxiety with depression which can be worsened by SARITA.  Reviewed previous overnight pulse oximetry study with patient showing extended time at or below 88% saturation being 151 minutes.  Discussed that this may be due to untreated sleep apnea.      We have discussed diagnostic options including in-laboratory, attended polysomnography and home sleep testing. We have also discussed treatment options including airway pressurization, reconstructive otolaryngologic surgery, dental appliances and weight management.     Subsequently,treatment options will be discussed based on the diagnostic study. Meanwhile, He is urged to avoid supine sleep, weight gain and alcoholic beverages since all of these can worsen SARITA. He is cautioned against drowsy driving. If He feels sleepy while driving, advised must pull over for a break/nap, rather than persist on the road, in the interest of Pt's own safety and that of others on the road.    Plan  -  He  will be scheduled for an overnight HST to assess sleep related breathing disorder.  - Follow up 1-2 weeks after sleep study to discuss results and treatment options moving forward   -Advised to reach out via MyChart or by phone with any questions or concerns.     2.  Regarding treatment of other past medical problems and general health maintenance,  Pt is urged to follow up with PCP.      Please note portions of this record was created using voice recognition software. I have made every reasonable attempt to correct obvious errors, but I expect that there are errors of grammar and possibly content I did not discover before finalizing the  note.

## 2022-12-05 ENCOUNTER — HOME STUDY (OUTPATIENT)
Dept: SLEEP MEDICINE | Facility: MEDICAL CENTER | Age: 52
End: 2022-12-05
Attending: STUDENT IN AN ORGANIZED HEALTH CARE EDUCATION/TRAINING PROGRAM
Payer: COMMERCIAL

## 2022-12-05 DIAGNOSIS — G47.30 SLEEP DISORDER BREATHING: ICD-10-CM

## 2022-12-05 DIAGNOSIS — G47.34 NOCTURNAL HYPOXIA: ICD-10-CM

## 2022-12-05 PROCEDURE — 95806 SLEEP STUDY UNATT&RESP EFFT: CPT | Performed by: STUDENT IN AN ORGANIZED HEALTH CARE EDUCATION/TRAINING PROGRAM

## 2022-12-19 ENCOUNTER — OFFICE VISIT (OUTPATIENT)
Dept: SLEEP MEDICINE | Facility: MEDICAL CENTER | Age: 52
End: 2022-12-19
Payer: COMMERCIAL

## 2022-12-19 VITALS
RESPIRATION RATE: 16 BRPM | BODY MASS INDEX: 29.06 KG/M2 | HEIGHT: 70 IN | DIASTOLIC BLOOD PRESSURE: 74 MMHG | SYSTOLIC BLOOD PRESSURE: 118 MMHG | OXYGEN SATURATION: 94 % | HEART RATE: 82 BPM | WEIGHT: 203 LBS

## 2022-12-19 DIAGNOSIS — G47.33 OSA (OBSTRUCTIVE SLEEP APNEA): ICD-10-CM

## 2022-12-19 PROCEDURE — 99214 OFFICE O/P EST MOD 30 MIN: CPT | Performed by: NURSE PRACTITIONER

## 2022-12-19 ASSESSMENT — FIBROSIS 4 INDEX: FIB4 SCORE: 1.06

## 2022-12-19 NOTE — PROGRESS NOTES
"Chief Complaint   Patient presents with    Apnea       HPI:  Vincent Lara is a 52 y.o. year old male here today for follow-up on sleep study results.  Last seen 10/21/2022 by Dr. He.  Past medical history includes depression with anxiety, paroxysmal A. fib, and nocturnal hypoxia.  Patient presented with complaints of occasional excessive daytime sleepiness, snoring, and nocturnal hypoxia. He underwent overnight pulse oximetry study in May of this year.  Study showed significant nocturnal hypoxia.  Patient states that he did have 1 episode of atrial fibrillation in the past, but it has resolved.    Home sleep study (12/5/2022):  Mild obstructive sleep apnea with  Respiratory Event Index (TIERA) of 7.8 per hour and worse in supine sleep with TIERA at 13.4. O2 Sat. oliver was 86% and mean O2 sat was 91% and baseline O2 at 93 %. O2 sat was below 88% for 37 min of the flow evaluation time.    ROS: As per HPI and otherwise negative if not stated.    Past Medical History:   Diagnosis Date    Heart burn     Psychiatric problem     depression       Past Surgical History:   Procedure Laterality Date    INGUINAL HERNIA REPAIR ROBOTIC XI Bilateral 1/6/2020    Procedure: REPAIR, HERNIA, INGUINAL, ROBOT-ASSISTED, USING DA [employer] XI-WITH MESH;  Surgeon: Randal Burnett M.D.;  Location: SURGERY [facility];  Service: General    OTHER  2012    Plunging ranula fixed (blocked duct)       History reviewed. No pertinent family history.    Allergies as of 12/19/2022    (No Known Allergies)        Vitals:  /74 (BP Location: Left arm, Patient Position: Sitting, BP Cuff Size: Adult)   Pulse 82   Resp 16   Ht 1.778 m (5' 10\")   Wt 92.1 kg (203 lb)   SpO2 94%     Current medications as of today   Current Outpatient Medications   Medication Sig Dispense Refill    propranolol (INDERAL) 10 MG Tab Take 1 Tablet by mouth 1 time a day as needed (scuba diving). 90 Tablet 0     No current facility-administered medications " for this visit.         Physical Exam:   Gen:           Alert and oriented, No apparent distress. Mood and affect appropriate, normal interaction with examiner.  Eyes:          PERRL, EOM intact, sclere white, conjunctive moist.  Ears:          Not examined.   Hearing:     Grossly intact.  Nose:          Normal, no lesions or deformities.  Dentition:    Good dentition.  Oropharynx:   Tongue normal, posterior pharynx without erythema or exudate.  Neck:        Supple, trachea midline, no masses.  Respiratory Effort: No intercostal retractions or use of accessory muscles.   Lung Auscultation:      Clear to auscultation bilaterally; no rales, rhonchi or wheezing.  CV:            Regular rate and rhythm. No murmurs, rubs or gallops.  Abd:           Not examined.   Lymphadenopathy: Not examined.  Gait and Station: Normal.  Digits and Nails: No clubbing, cyanosis, petechiae, or nodes.   Cranial Nerves: II-XII grossly intact.  Skin:        No rashes, lesions or ulcers noted.               Ext:           No cyanosis or edema.      Assessment:  1. SARITA (obstructive sleep apnea)  DME CPAP            Plan:   I reviewed with the patient the pathophysiology of obstructive sleep apnea, as well as potential cardiac and neurologic risks associated with untreated sleep apnea including CAD, HTN, pulmonary arterial hypertension, cardiac arrhythmias, heart attack or stroke.  SARITA patient's have increased risk of motor vehicle accidents, DM type II, chronic kidney disease and nonalcoholic liver disease.  He is cautioned against driving while sleepy for his safety and safety of others on the road. We reviewed treatment modalities for sleep apnea including CPAP/BiPAP therapy, ENT referral, dental appliance.      After informed discussion, an auto CPAP will be ordered today with recommended follow-up within the first 90 days of usage.  It is amendable to CPAP therapy.  If patient is intolerant, can consider UPPP or dental appliance.  Advised  patient to avoid supine sleep while waiting for CPAP.    Please note that this dictation was created using voice recognition software. I have made every reasonable attempt to correct obvious errors, but it is possible there are errors of grammar and possibly content that I did not discover before finalizing the note.

## 2022-12-19 NOTE — PROCEDURES
DIAGNOSTIC HOME SLEEP TEST (HST) REPORT       PATIENT ID:  NAME:  Vincent Lara  MRN:               [id number]  YOB: 1970  DATE OF STUDY: 12/5/2022      Impression:     This study shows evidence of:     1. Mild obstructive sleep apnea with  Respiratory Event Index (TIERA) of 7.8 per hour and worse in supine sleep with TIERA at 13.4. These findings are based on the recording time (flow evaluation time). It is not possible with this device to determine a traditional apnea+hypopnea index (AHI) for total sleep time since EEG channels are not available.     2. Oxygenation O2 Sat. oliver was 86% and mean O2 sat was 91% and baseline O2 at 93 %. O2 sat was below 88% for 37 min of the flow evaluation time. Oxygen Desaturation (>=3%) Index was elevated at 7.6/hr. AVG HR was 71 BPM.      TECHNICAL DESCRIPTION: [employer] apnea link air device used was a type-III home study device. Home sleep study recording included: Airflow recording by nasal pressure transducer; Respiratory Effort by 1 RIP Band; Oxygen Saturation and heart rate by finger oximetry. A position sensor and a snore channel was also used.    Scoring Criteria: A modification of the the AASM Manual for the Scoring of Sleep and Associated Events, 2012, was used.   Obstructive apnea was scored by cessation of airflow for at least 10 seconds with continuing respiratory effort.  Central apnea was scored by cessation of airflow for at least 10 seconds with no effort.  Hypopnea was scored by a 30% or more reduction in airflow for at least 10 seconds accompanied by an arterial oxygen desaturation of 3% or more.  (For Medicare patients, hypopneas were scored by a 30% or more reduction in airflow for at least 10 seconds accompanied by an arterial oxygen saturation of 4% or more, as required by their insurance, CMS.        General sleep summary: . Total recording time is 7 hours and 48 minutes and total flow evaluation time is 7 hours and 28 minutes. The  patient spent 2 hours and 23 minutes in the supine position and 5 hours and 4 minutes in the nonsupine position.    Respiratory events:    Apneas: Total 0 (Obstructive apnea index 0/hr, Central apnea index 0 /hr, mixed 0 /hour)  Hypopneas: Total 58 with a Hypopnea index of 7.8 /hr    Recommendations:    1. CPAP titration study vs Auto CPAP trial .   2.   In general patients with sleep apnea are advised to avoid alcohol and sedatives and to not operate a motor vehicle while drowsy. In some cases alternative treatment options may prove effective in resolving sleep apnea in these options include upper airway surgery, the use of a dental orthotic or weight loss and positional therapy. Clinical correlation is required.         Raman He MD

## 2022-12-21 ENCOUNTER — OFFICE VISIT (OUTPATIENT)
Dept: MEDICAL GROUP | Facility: CLINIC | Age: 52
End: 2022-12-21
Payer: COMMERCIAL

## 2022-12-21 DIAGNOSIS — Z01.89 ENCOUNTER FOR RESPIRATORY CLEARANCE EXAMINATION: ICD-10-CM

## 2022-12-21 DIAGNOSIS — E78.00 ELEVATED LDL CHOLESTEROL LEVEL: ICD-10-CM

## 2022-12-21 PROCEDURE — 99213 OFFICE O/P EST LOW 20 MIN: CPT | Mod: GC | Performed by: STUDENT IN AN ORGANIZED HEALTH CARE EDUCATION/TRAINING PROGRAM

## 2022-12-21 ASSESSMENT — FIBROSIS 4 INDEX: FIB4 SCORE: 1.06

## 2022-12-21 NOTE — PROGRESS NOTES
"[facility] FAMILY MEDICINE OFFICE VISIT    Date: 12/21/2022    MRN: [id number]  Patient ID: Vincent Lara    SUBJECTIVE:  Vincent Lara is a 52 y.o. male here for clearance to participate in scuba diving courses.  Patient was previously cleared back in May, however states that every time that he engages in a new class, he requires recertification of medical clearance.  Patient had an episode of atrial fibrillation in February, which is since been aborted.  Patient has had a full cardiac work-up including nuclear medicine stress test, all of which was normal.  Was also recently referred for a sleep study, which showed mild sleep apnea, however other pulmonary function testing was revealed is normal.  Patient reports that he can exercise without any shortness of breath, and swims well.  Denies any significant known family history of cardiovascular disease.  Has had surgeries in the past, though these do not continue to cause any problems for him.  Patient notes that he does have oral labetalol, which he takes prophylactically prior to dives.  States that this was diagnosed more for anxiety related to the dive than cardiac preventative medication.    PMHx/PSHx:  Past Medical History:   Diagnosis Date    Heart burn     Psychiatric problem     depression     Past Surgical History:   Procedure Laterality Date    INGUINAL HERNIA REPAIR ROBOTIC XI Bilateral 1/6/2020    Procedure: REPAIR, HERNIA, INGUINAL, ROBOT-ASSISTED, USING DA [employer] XI-WITH MESH;  Surgeon: Randal Burnett M.D.;  Location: SURGERY [facility];  Service: General    OTHER  2012    Plunging ranula fixed (blocked duct)       Allergies: Patient has no known allergies.    OBJECTIVE:  Vitals:    12/21/22 1121   BP: (P) 113/71   Pulse: (P) 82   Resp: (P) 17   Temp: (P) 36.6 °C (97.9 °F)   SpO2: (P) 95%     Vitals:    12/21/22 1121   BP: (P) 113/71   Weight: (P) 91.6 kg (202 lb)   Height: (P) 1.778 m (5' 10\")       Physical " Examination:  General: Well appearing male in no acute distress, resting on arrival to room  HEENT: Normocephalic, atraumatic, EOMI  Cardiovascular: Skin pink  Pulmonary: No tachypnea or retractions  Extremities: Moves all spontaneously  Neurological: Alert and oriented    ASSESSMENT & PLAN:  Vincent Lara is a 52 y.o. male here for clearance to participate in scuba diving, with further concerns as addressed below.    1. Encounter for respiratory clearance examination        2. Elevated LDL cholesterol level            No orders of the defined types were placed in this encounter.      #Encounter for respiratory clearance (scuba diving)  Physician filled out paperwork with patient, which did not reveal any concerning signs of symptoms that would prevent patient from participating in this activity.  Physician reviewed results of nuclear medicine study, echocardiogram, and pulmonary function testing, none of which demonstrate disease prohibitive of participating in the sport.  Patient did have an episode of atrial fibrillation, which is since been aborted.  Does have prophylactic labetalol which he takes prior to dives, which might potentially prevent development of A. fib with RVR during dives anyway.  At this time physician signed clearance form for patient and provided to them.    #Elevated LDL cholesterol  Discussed most recent results of cholesterol testing, revealing elevated LDL and total cholesterol.  Discussed that given time, this can eventually contribute to heart disease.  Discussed importance of dietary balance for managing this condition.  10-year stroke or heart attack risk calculated as less than 4%, would not benefit from statin medications at this time.    Odilon Velásquez M.D.  Family Medicine Resident  PGY-4

## 2024-04-19 ENCOUNTER — OFFICE VISIT (OUTPATIENT)
Dept: URGENT CARE | Facility: CLINIC | Age: 54
End: 2024-04-19
Payer: COMMERCIAL

## 2024-04-19 VITALS
HEIGHT: 70 IN | RESPIRATION RATE: 16 BRPM | OXYGEN SATURATION: 97 % | WEIGHT: 194 LBS | DIASTOLIC BLOOD PRESSURE: 78 MMHG | SYSTOLIC BLOOD PRESSURE: 124 MMHG | BODY MASS INDEX: 27.77 KG/M2 | HEART RATE: 76 BPM | TEMPERATURE: 97.3 F

## 2024-04-19 DIAGNOSIS — R05.1 ACUTE COUGH: ICD-10-CM

## 2024-04-19 PROCEDURE — 99213 OFFICE O/P EST LOW 20 MIN: CPT | Performed by: REGISTERED NURSE

## 2024-04-19 PROCEDURE — 3074F SYST BP LT 130 MM HG: CPT | Performed by: REGISTERED NURSE

## 2024-04-19 PROCEDURE — 3078F DIAST BP <80 MM HG: CPT | Performed by: REGISTERED NURSE

## 2024-04-19 RX ORDER — AZITHROMYCIN 250 MG/1
TABLET, FILM COATED ORAL
Qty: 6 TABLET | Refills: 0 | Status: SHIPPED | OUTPATIENT
Start: 2024-04-19

## 2024-04-19 RX ORDER — DEXTROMETHORPHAN HYDROBROMIDE AND PROMETHAZINE HYDROCHLORIDE 15; 6.25 MG/5ML; MG/5ML
5 SYRUP ORAL EVERY 4 HOURS PRN
Qty: 120 ML | Refills: 0 | Status: SHIPPED | OUTPATIENT
Start: 2024-04-19

## 2024-04-19 RX ORDER — BENZONATATE 100 MG/1
100 CAPSULE ORAL 3 TIMES DAILY PRN
Qty: 30 CAPSULE | Refills: 0 | Status: SHIPPED | OUTPATIENT
Start: 2024-04-19 | End: 2024-04-29

## 2024-04-19 ASSESSMENT — ENCOUNTER SYMPTOMS
ABDOMINAL PAIN: 0
DIZZINESS: 0
STRIDOR: 0
SHORTNESS OF BREATH: 0
FEVER: 0
HEADACHES: 0
NECK PAIN: 0
HEMOPTYSIS: 0

## 2024-04-19 NOTE — PROGRESS NOTES
Subjective:   Vincent Lara is a 53 y.o. male who presents for Flu Like Symptoms (X 5 days, painful cough, chest congestion, headache.)      HPI  5 days of flu like symptoms, no confirmed fevers. Body achess chills, congestion, semiproductive cough and headache. Hx of a-fib, no chronic lung issues. Not using OTC medications. Saturday he traveled back from [place].     Review of Systems   Constitutional:  Negative for fever.   Respiratory:  Negative for hemoptysis, shortness of breath and stridor.    Cardiovascular:  Negative for chest pain.   Gastrointestinal:  Negative for abdominal pain.   Musculoskeletal:  Negative for neck pain.   Neurological:  Negative for dizziness and headaches.       No Known Allergies    Patient Active Problem List    Diagnosis Date Noted    Atrial fibrillation (HCC) 03/09/2022    Activities involving scuba diving 03/09/2022    Inguinal hernia, right 11/18/2019    Male erectile disorder (CODE) 11/18/2019    Arthralgia of right knee 12/06/2016    Preventative health care 12/06/2016    Depression with anxiety 08/12/2014       Current Outpatient Medications Ordered in Epic   Medication Sig Dispense Refill    benzonatate (TESSALON) 100 MG Cap Take 1 Capsule by mouth 3 times a day as needed for Cough for up to 10 days. 30 Capsule 0    azithromycin (ZITHROMAX) 250 MG Tab Take 2 tabs PO on day 1, take 1 tab PO day 2-5 6 Tablet 0    promethazine-dextromethorphan (PROMETHAZINE-DM) 6.25-15 MG/5ML syrup Take 5 mL by mouth every four hours as needed for Cough. 120 mL 0    propranolol (INDERAL) 10 MG Tab Take 1 Tablet by mouth 1 time a day as needed (scuba diving). (Patient not taking: Reported on 4/19/2024) 90 Tablet 0     No current Epic-ordered facility-administered medications on file.       Past Surgical History:   Procedure Laterality Date    INGUINAL HERNIA REPAIR ROBOTIC XI Bilateral 1/6/2020    Procedure: REPAIR, HERNIA, INGUINAL, ROBOT-ASSISTED, USING DA LENNOX  "XI-WITH MESH;  Surgeon: Randal Burnett M.D.;  Location[facility];  Service: General    OTHER  2012    Plunging ranula fixed (blocked duct)       Social History     Tobacco Use    Smoking status: Never    Smokeless tobacco: Never   Vaping Use    Vaping Use: Never used   Substance Use Topics    Alcohol use: Yes     Alcohol/week: 4.2 oz     Types: 7 Standard drinks or equivalent per week     Comment: wine or beer, one a day    Drug use: No       family history is not on file.     Problem list, medications, and allergies reviewed by myself today in Epic.     Objective:   /78   Pulse 76   Temp 36.3 °C (97.3 °F) (Temporal)   Resp 16   Ht 1.778 m (5' 10\")   Wt 88 kg (194 lb)   SpO2 97%   BMI 27.84 kg/m²     Physical Exam  Vitals and nursing note reviewed.   Constitutional:       Appearance: Normal appearance. He is not ill-appearing or toxic-appearing.   HENT:      Right Ear: Tympanic membrane, ear canal and external ear normal.      Left Ear: Tympanic membrane, ear canal and external ear normal.      Nose: Congestion present.      Mouth/Throat:      Mouth: Mucous membranes are moist.      Pharynx: No oropharyngeal exudate or posterior oropharyngeal erythema.      Comments: Clear PND.  Uvula midline.  Clear voice.  No signs of obstruction.  Eyes:      Pupils: Pupils are equal, round, and reactive to light.   Cardiovascular:      Rate and Rhythm: Normal rate and regular rhythm.   Pulmonary:      Effort: Pulmonary effort is normal. No respiratory distress.      Breath sounds: Normal breath sounds.   Musculoskeletal:         General: Normal range of motion.      Cervical back: Normal range of motion. No rigidity.   Lymphadenopathy:      Cervical: No cervical adenopathy.   Skin:     General: Skin is warm and dry.      Capillary Refill: Capillary refill takes less than 2 seconds.      Findings: No rash.   Neurological:      General: No focal deficit present.      Mental Status: He is alert and " oriented to person, place, and time.   Psychiatric:         Mood and Affect: Mood normal.         Assessment/Plan:     I personally reviewed prior external notes and test results pertinent to today's visit as well as additional imaging and testing completed in clinic today.     1. Acute cough  benzonatate (TESSALON) 100 MG Cap    azithromycin (ZITHROMAX) 250 MG Tab    promethazine-dextromethorphan (PROMETHAZINE-DM) 6.25-15 MG/5ML syrup        Patient presenting with cold symptoms x 5 days started after he traveled back from [place].  No chronic lung disease.  Does have history of A-fib.  Not using OTC meds.  Thankfully his vitals are reassuring.  Does appear well and nontoxic.  Does have congestion and clear PND no signs of airway obstruction and normal neck range of motion, no rash, no adventitious lung sounds.  Did offer reassurance and discussed likely viral nature of symptoms however given being an area that does have pertussis will place on azithromycin also antitussive medication including benzonatate and Promethazine DM.  Discussed pulmonary toilet.  Increase fluids.  Monitor symptoms.  Reviewed signs symptoms that require immediate attention    Shared decision-making was utilized with patient for treatment plan. Differential Diagnosis, natural history, and supportive care discussed.     Medication discussed included indication for use and the potential benefits and side effects. Education was provided regarding the aforementioned assessments. All of the patient's questions were answered to their satisfaction at the time of discharge. Patient was encouraged to monitor symptoms closely. Those signs and symptoms which would warrant concern and mandate seeking a higher level of service through the emergency department discussed at length. Patient stated agreement and understanding of this plan of care.     Please note that this dictation was created using voice recognition software. I have made every  reasonable attempt to correct obvious errors, but I expect that there are errors of grammar and possibly content that I did not discover before finalizing the note.    This note was electronically signed by LISA Rdz

## 2024-09-16 ENCOUNTER — HOSPITAL ENCOUNTER (EMERGENCY)
Facility: MEDICAL CENTER | Age: 54
End: 2024-09-16
Attending: EMERGENCY MEDICINE
Payer: COMMERCIAL

## 2024-09-16 ENCOUNTER — OFFICE VISIT (OUTPATIENT)
Dept: CARDIOLOGY | Facility: MEDICAL CENTER | Age: 54
End: 2024-09-16
Attending: EMERGENCY MEDICINE
Payer: COMMERCIAL

## 2024-09-16 VITALS
RESPIRATION RATE: 18 BRPM | OXYGEN SATURATION: 94 % | HEIGHT: 70 IN | DIASTOLIC BLOOD PRESSURE: 72 MMHG | SYSTOLIC BLOOD PRESSURE: 95 MMHG | WEIGHT: 195 LBS | TEMPERATURE: 96.7 F | BODY MASS INDEX: 27.92 KG/M2 | HEART RATE: 78 BPM

## 2024-09-16 VITALS
HEART RATE: 84 BPM | BODY MASS INDEX: 27.92 KG/M2 | DIASTOLIC BLOOD PRESSURE: 70 MMHG | OXYGEN SATURATION: 95 % | WEIGHT: 195 LBS | RESPIRATION RATE: 14 BRPM | HEIGHT: 70 IN | SYSTOLIC BLOOD PRESSURE: 112 MMHG

## 2024-09-16 DIAGNOSIS — I48.91 ATRIAL FIBRILLATION WITH RVR (HCC): ICD-10-CM

## 2024-09-16 DIAGNOSIS — E78.5 DYSLIPIDEMIA: ICD-10-CM

## 2024-09-16 DIAGNOSIS — I48.0 PAF (PAROXYSMAL ATRIAL FIBRILLATION) (HCC): ICD-10-CM

## 2024-09-16 LAB
ALBUMIN SERPL BCP-MCNC: 4.3 G/DL (ref 3.2–4.9)
ALBUMIN/GLOB SERPL: 1.5 G/DL
ALP SERPL-CCNC: 67 U/L (ref 30–99)
ALT SERPL-CCNC: 21 U/L (ref 2–50)
ANION GAP SERPL CALC-SCNC: 12 MMOL/L (ref 7–16)
AST SERPL-CCNC: 23 U/L (ref 12–45)
BASOPHILS # BLD AUTO: 0.4 % (ref 0–1.8)
BASOPHILS # BLD: 0.04 K/UL (ref 0–0.12)
BILIRUB SERPL-MCNC: 0.7 MG/DL (ref 0.1–1.5)
BUN SERPL-MCNC: 22 MG/DL (ref 8–22)
CALCIUM ALBUM COR SERPL-MCNC: 9.4 MG/DL (ref 8.5–10.5)
CALCIUM SERPL-MCNC: 9.6 MG/DL (ref 8.4–10.2)
CHLORIDE SERPL-SCNC: 105 MMOL/L (ref 96–112)
CO2 SERPL-SCNC: 25 MMOL/L (ref 20–33)
CREAT SERPL-MCNC: 1.12 MG/DL (ref 0.5–1.4)
EKG IMPRESSION: NORMAL
EKG IMPRESSION: NORMAL
EOSINOPHIL # BLD AUTO: 0.49 K/UL (ref 0–0.51)
EOSINOPHIL NFR BLD: 5.1 % (ref 0–6.9)
ERYTHROCYTE [DISTWIDTH] IN BLOOD BY AUTOMATED COUNT: 42.2 FL (ref 35.9–50)
GFR SERPLBLD CREATININE-BSD FMLA CKD-EPI: 78 ML/MIN/1.73 M 2
GLOBULIN SER CALC-MCNC: 2.9 G/DL (ref 1.9–3.5)
GLUCOSE SERPL-MCNC: 106 MG/DL (ref 65–99)
HCT VFR BLD AUTO: 51.7 % (ref 42–52)
HGB BLD-MCNC: 18.4 G/DL (ref 14–18)
IMM GRANULOCYTES # BLD AUTO: 0.03 K/UL (ref 0–0.11)
IMM GRANULOCYTES NFR BLD AUTO: 0.3 % (ref 0–0.9)
LYMPHOCYTES # BLD AUTO: 2.61 K/UL (ref 1–4.8)
LYMPHOCYTES NFR BLD: 27.4 % (ref 22–41)
MCH RBC QN AUTO: 33.6 PG (ref 27–33)
MCHC RBC AUTO-ENTMCNC: 35.6 G/DL (ref 32.3–36.5)
MCV RBC AUTO: 94.3 FL (ref 81.4–97.8)
MONOCYTES # BLD AUTO: 0.9 K/UL (ref 0–0.85)
MONOCYTES NFR BLD AUTO: 9.4 % (ref 0–13.4)
NEUTROPHILS # BLD AUTO: 5.47 K/UL (ref 1.82–7.42)
NEUTROPHILS NFR BLD: 57.4 % (ref 44–72)
NRBC # BLD AUTO: 0 K/UL
NRBC BLD-RTO: 0 /100 WBC (ref 0–0.2)
PLATELET # BLD AUTO: 255 K/UL (ref 164–446)
PMV BLD AUTO: 8.7 FL (ref 9–12.9)
POTASSIUM SERPL-SCNC: 4.4 MMOL/L (ref 3.6–5.5)
PROT SERPL-MCNC: 7.2 G/DL (ref 6–8.2)
RBC # BLD AUTO: 5.48 M/UL (ref 4.7–6.1)
SODIUM SERPL-SCNC: 142 MMOL/L (ref 135–145)
WBC # BLD AUTO: 9.5 K/UL (ref 4.8–10.8)

## 2024-09-16 PROCEDURE — 36415 COLL VENOUS BLD VENIPUNCTURE: CPT

## 2024-09-16 PROCEDURE — 96374 THER/PROPH/DIAG INJ IV PUSH: CPT

## 2024-09-16 PROCEDURE — 3078F DIAST BP <80 MM HG: CPT | Performed by: INTERNAL MEDICINE

## 2024-09-16 PROCEDURE — G2211 COMPLEX E/M VISIT ADD ON: HCPCS | Performed by: INTERNAL MEDICINE

## 2024-09-16 PROCEDURE — 99214 OFFICE O/P EST MOD 30 MIN: CPT | Performed by: INTERNAL MEDICINE

## 2024-09-16 PROCEDURE — 700111 HCHG RX REV CODE 636 W/ 250 OVERRIDE (IP): Mod: JZ | Performed by: EMERGENCY MEDICINE

## 2024-09-16 PROCEDURE — 99211 OFF/OP EST MAY X REQ PHY/QHP: CPT | Performed by: INTERNAL MEDICINE

## 2024-09-16 PROCEDURE — 80053 COMPREHEN METABOLIC PANEL: CPT

## 2024-09-16 PROCEDURE — 93005 ELECTROCARDIOGRAM TRACING: CPT | Performed by: EMERGENCY MEDICINE

## 2024-09-16 PROCEDURE — 93005 ELECTROCARDIOGRAM TRACING: CPT | Performed by: INTERNAL MEDICINE

## 2024-09-16 PROCEDURE — 3074F SYST BP LT 130 MM HG: CPT | Performed by: INTERNAL MEDICINE

## 2024-09-16 PROCEDURE — 99285 EMERGENCY DEPT VISIT HI MDM: CPT

## 2024-09-16 PROCEDURE — 85025 COMPLETE CBC W/AUTO DIFF WBC: CPT

## 2024-09-16 PROCEDURE — 700105 HCHG RX REV CODE 258: Performed by: EMERGENCY MEDICINE

## 2024-09-16 PROCEDURE — 93010 ELECTROCARDIOGRAM REPORT: CPT | Performed by: INTERNAL MEDICINE

## 2024-09-16 RX ORDER — FLECAINIDE ACETATE 50 MG/1
100 TABLET ORAL 2 TIMES DAILY
Qty: 180 TABLET | Refills: 3 | Status: SHIPPED | OUTPATIENT
Start: 2024-09-16

## 2024-09-16 RX ORDER — DILTIAZEM HYDROCHLORIDE 5 MG/ML
0.25 INJECTION INTRAVENOUS ONCE
Status: COMPLETED | OUTPATIENT
Start: 2024-09-16 | End: 2024-09-16

## 2024-09-16 RX ORDER — METOPROLOL SUCCINATE 25 MG/1
25 TABLET, EXTENDED RELEASE ORAL DAILY
Qty: 90 TABLET | Refills: 3 | Status: SHIPPED | OUTPATIENT
Start: 2024-09-16

## 2024-09-16 RX ORDER — ASPIRIN 81 MG/1
81 TABLET ORAL DAILY
Qty: 100 TABLET | Refills: 0 | Status: SHIPPED | OUTPATIENT
Start: 2024-09-16

## 2024-09-16 RX ORDER — METOPROLOL SUCCINATE 25 MG/1
25 TABLET, EXTENDED RELEASE ORAL DAILY
Qty: 30 TABLET | Refills: 0 | Status: SHIPPED | OUTPATIENT
Start: 2024-09-16 | End: 2024-09-16 | Stop reason: SDUPTHER

## 2024-09-16 RX ORDER — SODIUM CHLORIDE 9 MG/ML
1000 INJECTION, SOLUTION INTRAVENOUS ONCE
Status: COMPLETED | OUTPATIENT
Start: 2024-09-16 | End: 2024-09-16

## 2024-09-16 RX ADMIN — SODIUM CHLORIDE 1000 ML: 9 INJECTION, SOLUTION INTRAVENOUS at 06:56

## 2024-09-16 RX ADMIN — DILTIAZEM HYDROCHLORIDE 22.15 MG: 5 INJECTION INTRAVENOUS at 06:54

## 2024-09-16 ASSESSMENT — FIBROSIS 4 INDEX: FIB4 SCORE: 1.06

## 2024-09-16 NOTE — ED NOTES
D/c pt home, rx given . Pt aware of f/u instructions with cardiology  , aware to return for any changes or concerns. No further questions upon d/c home from ed

## 2024-09-16 NOTE — PATIENT INSTRUCTIONS
We addressed the management of antiarrhythmic medication at today's visit.  He understands the risks and benefits of flecainide therapy including pill-in-the-pocket approach (TAKE UP  MG TOTAL DAILY DOSE FOR EPISODES OF ATRIAL FIBRILLATION AND LET US KNOW).  We have specifically made sure that his anticoagulation is appropriate, there is no history of clinical coronary artery disease (including stress testing when applicable).  he is regularly screened for arrhythmias and drug interactions.  He understands that this is a high risk medication and requires frequent monitoring, as well as informing providers for any new medication to make sure it is appropriate based on potentially significant drug interactions.      Work on at least 2.5 - 5 hours a week of moderate exercise (typical brisk walking or similar activity)    If you have had a heart attack, stent, bypass or reduced heart strength (EF <35%): cardiac rehab may reduce your risk of dying by 13-24% and need to go to the hospital by 30% within the first year (1)    Please look into the following diets and incorporate them into your diet    LOW SALT DIET   KEEP YOUR SODIUM EQUAL TO CALORIES AND NO MORE THAN DOUBLE THE CALORIES FOR A LOW SALT DIET    Cardiosmart.org - great resource for American College of Cardiology on heart disease prevention and treatment    FOR TREATMENT OR PREVENTION OF CORONARY ARTERY DISEASE  These three programs are approved by Medicare/Insurers for those with heart disease  [facility]  [facility] Reversing Heart Disease - Logan Petersens Cardiologist vegetarian-based  [facility] - [place]-based mind-body Program    Mediterranean Diet has been shown to be a hearty healthy diet.    This is a commonly referenced Program  Dr Amato - [employer] (book and documentary) - vegetarian-based    FOR TREATMENT OF BLOOD PRESSURE  DASH DIET - American Heart  Association for treatment of HYPERTENSION    FOR TREATMENT OF BAD CHOLESTEROL/FATS  REDUCE PROCESSED SUGAR AS MUCH AS POSSIBLE  INCREASE WHOLE GRAINS/VEGETABLES  INCREASE FIBER    Lowering total cholesterol and LDL (bad) cholesterol:  - Eat leaner cuts of meat, or eliminate altogether if possible red meat, and frequently substitute fish or chicken.  - Limit saturated fat to no more than 7-10% of total calories no more than 10 g per day is recommended. Some sources of saturated fat include butter, animal fats, hydrogenated vegetable fats and oils, many desserts, whole milk dairy products.  - Replaced saturated fats with polyunsaturated fats and monounsaturated fats. Foods high in monounsaturated fat include nuts, canola oil, avocados, and olives.  - Limit trans fat (processed foods) and replaced with fresh fruits and vegetables  - Recommend nonfat dairy products  - Increase substantially the amount of soluble fiber intake (legumes such as beans, fruit, whole grains).  - Consider nutritional supplements: plant sterile spreads such as Benecol, fish oil,  flaxseed oil, omega-3 acids capsules 1000 mg twice a day, or viscous fiber such as Metamucil  - Attain ideal weight and regular exercise (at least 30 minutes per day of moderate exercise)  ASK ABOUT STATIN OR NON STATIN MEDICATION TO REDUCE YOUR LDL AND HEART RISK    Lowering triglycerides:  - Reduce intake of simple sugar: Desserts, candy, pastries, honey, sodas, sugared cereals, yogurt, Gatorade, sports bars, canned fruit, smoothies, fruit juice, coffee drinks  - Reduced intake of refined starches: Refined Pasta, most bread  - Reduce or abstain from alcohol  - Increase omega-3 fatty acids: [place], Trout, Mackerel, [place], [place] tuna and supplements  - Attain ideal weight and regular exercise (at least 30 minutes per day of moderate exercise)  ASK ABOUT PURIFIED OMEGA 3 EPA or FISH OIL TO REDUCE YOUR TG AND HEART RISK    Elevating HDL (good) cholesterol:  -  Increase physical activity  - Increase omega-3 fatty acids and supplements as listed above  - Incorporating appropriate amounts of monounsaturated fats such as nuts, olive oil, canola oil, avocados, olives  - Stop smoking  - Attain ideal weight and regular exercise (at least 30 minutes per day of moderate exercise)

## 2024-09-16 NOTE — ED NOTES
Got a new set of vitals, pt is still in A-Fib HR goes to low 60's to high 80's, pt states feeling better compared to arrival. Pt stated they did not need anything at this moment, left call light within reach.

## 2024-09-16 NOTE — ED TRIAGE NOTES
"Chief Complaint   Patient presents with    Palpitations     Pt reports he has been in a-fib since last night at 2300. Pt reports he has this happen in feb of 2021 but it only lasted an hour. Pts watch told him his rate was 145.      /77   Pulse 65   Temp 36.6 °C (97.8 °F) (Temporal)   Resp 16   Ht 1.778 m (5' 10\")   Wt 88.5 kg (195 lb)   SpO2 95%   BMI 27.98 kg/m²     "

## 2024-09-16 NOTE — ED PROVIDER NOTES
ED Provider Note    CHIEF COMPLAINT  Chief Complaint   Patient presents with    Palpitations     Pt reports he has been in a-fib since last night at 2300. Pt reports he has this happen in feb of 2021 but it only lasted an hour. Pts watch told him his rate was 145.        EXTERNAL RECORDS REVIEWED  Other outpatient echocardiogram and nuclear medicine stress test negative for ischemia or evidence of heart failure    HPI/ROS      Vincent Lara is a 54 y.o. male who presents with chief complaint of palpitations.  Patient reports symptoms since around 11:00 last night.  Patient has a smart watch, which has tracked his heart rate.  Heart rate was normal up until around 11:00 last night.  This morning he did an EKG on his watch which stated he was in atrial fibrillation.  Patient has a history of paroxysmal atrial fibrillation, but has only had 1 episode in 2021, therefore is not anticoagulated.  Patient denies any associated significant chest pain or shortness of breath.  He reports he feels mildly uneasy.  He did drink a few beers and a few glasses of wine yesterday which was relatively similar to the context of his first episode of atrial fibrillation.    PAST MEDICAL HISTORY   has a past medical history of Heart burn and Psychiatric problem.    SURGICAL HISTORY   has a past surgical history that includes other (2012) and inguinal hernia repair robotic xi (Bilateral, 1/6/2020).    FAMILY HISTORY  No family history on file.    SOCIAL HISTORY  Social History     Tobacco Use    Smoking status: Never    Smokeless tobacco: Never   Vaping Use    Vaping status: Never Used   Substance and Sexual Activity    Alcohol use: Yes     Alcohol/week: 4.2 oz     Types: 7 Standard drinks or equivalent per week     Comment: wine or beer, one a day    Drug use: No    Sexual activity: Not on file       CURRENT MEDICATIONS  Home Medications       Reviewed by Shayna Rivera R.N. (Registered Nurse) on 09/16/24 at 0655   "Med List Status: Partial     Medication Last Dose Status   azithromycin (ZITHROMAX) 250 MG Tab  Active   promethazine-dextromethorphan (PROMETHAZINE-DM) 6.25-15 MG/5ML syrup  Active   propranolol (INDERAL) 10 MG Tab  Active                    ALLERGIES  No Known Allergies    PHYSICAL EXAM  VITAL SIGNS: BP 95/72   Pulse 78   Temp 35.9 °C (96.7 °F) (Temporal)   Resp 18   Ht 1.778 m (5' 10\")   Wt 88.5 kg (195 lb)   SpO2 94%   BMI 27.98 kg/m²    Physical Exam  Constitutional:       Appearance: Normal appearance.   HENT:      Head: Normocephalic.      Right Ear: Tympanic membrane normal.      Left Ear: Tympanic membrane normal.      Nose: Nose normal.      Mouth/Throat:      Mouth: Mucous membranes are moist.   Eyes:      Extraocular Movements: Extraocular movements intact.      Pupils: Pupils are equal, round, and reactive to light.   Cardiovascular:      Rate and Rhythm: Normal rate and regular rhythm.   Pulmonary:      Effort: Pulmonary effort is normal. No respiratory distress.      Breath sounds: Normal breath sounds. No stridor. No wheezing or rales.   Chest:      Chest wall: No tenderness.   Abdominal:      General: Abdomen is flat. There is no distension.      Palpations: Abdomen is soft. There is no mass.      Tenderness: There is no abdominal tenderness.   Musculoskeletal:      Cervical back: Normal range of motion.   Skin:     General: Skin is warm.      Capillary Refill: Capillary refill takes less than 2 seconds.   Neurological:      General: No focal deficit present.      Mental Status: He is alert and oriented to person, place, and time.   Psychiatric:         Mood and Affect: Mood normal.           EKG/LABS  Results for orders placed or performed during the hospital encounter of 09/16/24   CBC WITH DIFFERENTIAL   Result Value Ref Range    WBC 9.5 4.8 - 10.8 K/uL    RBC 5.48 4.70 - 6.10 M/uL    Hemoglobin 18.4 (H) 14.0 - 18.0 g/dL    Hematocrit 51.7 42.0 - 52.0 %    MCV 94.3 81.4 - 97.8 fL    MCH " 33.6 (H) 27.0 - 33.0 pg    MCHC 35.6 32.3 - 36.5 g/dL    RDW 42.2 35.9 - 50.0 fL    Platelet Count 255 164 - 446 K/uL    MPV 8.7 (L) 9.0 - 12.9 fL    Neutrophils-Polys 57.40 44.00 - 72.00 %    Lymphocytes 27.40 22.00 - 41.00 %    Monocytes 9.40 0.00 - 13.40 %    Eosinophils 5.10 0.00 - 6.90 %    Basophils 0.40 0.00 - 1.80 %    Immature Granulocytes 0.30 0.00 - 0.90 %    Nucleated RBC 0.00 0.00 - 0.20 /100 WBC    Neutrophils (Absolute) 5.47 1.82 - 7.42 K/uL    Lymphs (Absolute) 2.61 1.00 - 4.80 K/uL    Monos (Absolute) 0.90 (H) 0.00 - 0.85 K/uL    Eos (Absolute) 0.49 0.00 - 0.51 K/uL    Baso (Absolute) 0.04 0.00 - 0.12 K/uL    Immature Granulocytes (abs) 0.03 0.00 - 0.11 K/uL    NRBC (Absolute) 0.00 K/uL   CMP   Result Value Ref Range    Sodium 142 135 - 145 mmol/L    Potassium 4.4 3.6 - 5.5 mmol/L    Chloride 105 96 - 112 mmol/L    Co2 25 20 - 33 mmol/L    Anion Gap 12.0 7.0 - 16.0    Glucose 106 (H) 65 - 99 mg/dL    Bun 22 8 - 22 mg/dL    Creatinine 1.12 0.50 - 1.40 mg/dL    Calcium 9.6 8.4 - 10.2 mg/dL    Correct Calcium 9.4 8.5 - 10.5 mg/dL    AST(SGOT) 23 12 - 45 U/L    ALT(SGPT) 21 2 - 50 U/L    Alkaline Phosphatase 67 30 - 99 U/L    Total Bilirubin 0.7 0.1 - 1.5 mg/dL    Albumin 4.3 3.2 - 4.9 g/dL    Total Protein 7.2 6.0 - 8.2 g/dL    Globulin 2.9 1.9 - 3.5 g/dL    A-G Ratio 1.5 g/dL   ESTIMATED GFR   Result Value Ref Range    GFR (CKD-EPI) 78 >60 mL/min/1.73 m 2   EKG   Result Value Ref Range    Report       [facility] Emergency Dept[facility]    Test Date:  2024  Pt Name:    DAVE NOLASCO               Department: [facility]  MRN:        [id number]                      [facility]  Gender:     Male                         Technician: [id number]  :        1970                   Requested By:JONNIE TRAYLOR  Order #:    [id number]                    Reading MD: Romina Donald MD    Measurements  Intervals                                Axis  Rate:       146                           P:          0  TN:         0                            QRS:        17  QRSD:       77                           T:          2  QT:         288  QTc:        449    Interpretive Statements  EKG is atrial fibrillation with RVR, no ST elevation or depression consistent  with acute regional ischemia  Electronically Signed On 09- 07:31:50 PDT by Romina Donald MD         I have independently interpreted this EKG        COURSE & MEDICAL DECISION MAKING    ASSESSMENT, COURSE AND PLAN  Care Narrative: Patient here with likely holiday heart atrial fibrillation.  He is in atrial fibrillation with RVR at this point and will require some rate control.  Will give diltiazem.  We will see if patient self converts despite antiarrhythmic.  If symptoms persist I do believe that cardioversion would be reasonable here especially given the patient has a smart watch which is relatively accurate in determining patient's rate and rhythm but certainly not definitive.  Patient following diltiazem has a normal rate now in the 70s.  He is now asymptomatic.  As patient does not have ongoing symptoms, it is possible that he had a normal rate with atrial fibrillation for quite some time, greater than 48 hours and therefore I do believe that cardioversion does pose at least a mild risk of stroke without TRICIA.  As patient symptoms are minimal, and his [id number] is 0 I do believe that deferring cardioversion at this point is reasonable.  Will send patient home with metoprolol, aspirin.  Return precautions reviewed.  Discussed the case with cardiology who will help coordinate outpatient care.            DISPOSITION AND DISCUSSIONS  I have discussed management of the patient with the following physicians and NICOLETTE's:  Voalte conversation with Dr. Bustamante        Decision tools and prescription drugs considered including, but not limited to: [id number] of 0, therefore patient sent home with aspirin instead of anticoagulation  FINAL DIAGNOSIS  1.  Atrial fibrillation with RVR (HCC)         Electronically signed by: Odilon Vanegas M.D., 9/16/2024 6:40 AM

## 2024-09-16 NOTE — PROGRESS NOTES
CC here for urgent follow up of afib      Subjective     [place] Nolan Lara is a 54 y.o. male who presents today with pAF  had episode today in ER given dilt and reverted    Had afib today seen in the ER last episode was 3 years ago    Was recently evacuated for [place]    Going to [facility] for 6 months next year    Past Medical History:   Diagnosis Date    Heart burn     Psychiatric problem     depression     Past Surgical History:   Procedure Laterality Date    INGUINAL HERNIA REPAIR ROBOTIC XI Bilateral 1/6/2020    Procedure: REPAIR, HERNIA, INGUINAL, ROBOT-ASSISTED, USING DA LENNOX XI-WITH MESH;  Surgeon: Randal Burnett M.D.;  Location: SURGERY [facility];  Service: General    OTHER  2012    Plunging ranula fixed (blocked duct)     History reviewed. No pertinent family history.  Social History     Socioeconomic History    Marital status:      Spouse name: Not on file    Number of children: Not on file    Years of education: Not on file    Highest education level: Not on file   Occupational History    Not on file   Tobacco Use    Smoking status: Never    Smokeless tobacco: Never   Vaping Use    Vaping status: Never Used   Substance and Sexual Activity    Alcohol use: Yes     Alcohol/week: 4.2 oz     Types: 7 Standard drinks or equivalent per week     Comment: wine or beer, one a day    Drug use: No    Sexual activity: Not on file   Other Topics Concern    Not on file   Social History Narrative    Not on file     Social Determinants of Health     Financial Resource Strain: Not on file   Food Insecurity: Not on file   Transportation Needs: Not on file   Physical Activity: Not on file   Stress: Not on file   Social Connections: Not on file   Intimate Partner Violence: Not on file   Housing Stability: Not on file     No Known Allergies  Outpatient Encounter Medications as of 9/16/2024   Medication Sig Dispense Refill    flecainide (TAMBOCOR) 50 MG tablet Take 2 Tablets by mouth 2 times a day.  "180 Tablet 3    metoprolol SR (TOPROL XL) 25 MG TABLET SR 24 HR Take 1 Tablet by mouth every day. 90 Tablet 3    aspirin 81 MG EC tablet Take 1 Tablet by mouth every day. 100 Tablet 0    [DISCONTINUED] metoprolol SR (TOPROL XL) 25 MG TABLET SR 24 HR Take 1 Tablet by mouth every day. 30 Tablet 0    promethazine-dextromethorphan (PROMETHAZINE-DM) 6.25-15 MG/5ML syrup Take 5 mL by mouth every four hours as needed for Cough. (Patient not taking: Reported on 9/16/2024) 120 mL 0    [DISCONTINUED] azithromycin (ZITHROMAX) 250 MG Tab Take 2 tabs PO on day 1, take 1 tab PO day 2-5 (Patient not taking: Reported on 9/16/2024) 6 Tablet 0     No facility-administered encounter medications on file as of 9/16/2024.     ROS           Objective     /70 (BP Location: Left arm, Patient Position: Sitting, BP Cuff Size: Adult)   Pulse 84   Resp 14   Ht 1.778 m (5' 10\")   Wt 88.5 kg (195 lb)   SpO2 95%   BMI 27.98 kg/m²     Physical Exam  Constitutional:       General: He is not in acute distress.     Appearance: He is not diaphoretic.   Eyes:      General: No scleral icterus.  Neck:      Vascular: No JVD.   Cardiovascular:      Rate and Rhythm: Normal rate.      Heart sounds: Normal heart sounds. No murmur heard.     No friction rub. No gallop.   Pulmonary:      Effort: No respiratory distress.      Breath sounds: No wheezing or rales.   Abdominal:      General: Bowel sounds are normal.      Palpations: Abdomen is soft.   Musculoskeletal:      Right lower leg: No edema.      Left lower leg: No edema.   Skin:     Findings: No rash.   Neurological:      Mental Status: He is alert. Mental status is at baseline.   Psychiatric:         Mood and Affect: Mood normal.            We reviewed in person the most recent labs  Recent Results (from the past 5040 hour(s))   EKG    Collection Time: 09/16/24  6:27 AM   Result Value Ref Range    Report       [facility] Emergency Dept.    Test Date:  2024-09-16  Pt " Name:    DAVE NOLASCO               [facility]  MRN:        [id number]                      [facility]  Gender:     Male                         Technician: [id number]  :        1970                   Requested By:JONNIE TRAYLOR  Order #:    [id number]                    Yolande MD: Romina Donald MD    Measurements  Intervals                                Axis  Rate:       146                          P:          0  MS:         0                            QRS:        17  QRSD:       77                           T:          2  QT:         288  QTc:        449    Interpretive Statements  EKG is atrial fibrillation with RVR, no ST elevation or depression consistent  with acute regional ischemia  Electronically Signed On 2024 07:31:50 PDT by Romina Donald MD     CBC WITH DIFFERENTIAL    Collection Time: 24  6:48 AM   Result Value Ref Range    WBC 9.5 4.8 - 10.8 K/uL    RBC 5.48 4.70 - 6.10 M/uL    Hemoglobin 18.4 (H) 14.0 - 18.0 g/dL    Hematocrit 51.7 42.0 - 52.0 %    MCV 94.3 81.4 - 97.8 fL    MCH 33.6 (H) 27.0 - 33.0 pg    MCHC 35.6 32.3 - 36.5 g/dL    RDW 42.2 35.9 - 50.0 fL    Platelet Count 255 164 - 446 K/uL    MPV 8.7 (L) 9.0 - 12.9 fL    Neutrophils-Polys 57.40 44.00 - 72.00 %    Lymphocytes 27.40 22.00 - 41.00 %    Monocytes 9.40 0.00 - 13.40 %    Eosinophils 5.10 0.00 - 6.90 %    Basophils 0.40 0.00 - 1.80 %    Immature Granulocytes 0.30 0.00 - 0.90 %    Nucleated RBC 0.00 0.00 - 0.20 /100 WBC    Neutrophils (Absolute) 5.47 1.82 - 7.42 K/uL    Lymphs (Absolute) 2.61 1.00 - 4.80 K/uL    Monos (Absolute) 0.90 (H) 0.00 - 0.85 K/uL    Eos (Absolute) 0.49 0.00 - 0.51 K/uL    Baso (Absolute) 0.04 0.00 - 0.12 K/uL    Immature Granulocytes (abs) 0.03 0.00 - 0.11 K/uL    NRBC (Absolute) 0.00 K/uL   CMP    Collection Time: 24  6:48 AM   Result Value Ref Range    Sodium 142 135 - 145 mmol/L    Potassium 4.4 3.6 - 5.5 mmol/L    Chloride 105 96 - 112 mmol/L    Co2 25 20 - 33  mmol/L    Anion Gap 12.0 7.0 - 16.0    Glucose 106 (H) 65 - 99 mg/dL    Bun 22 8 - 22 mg/dL    Creatinine 1.12 0.50 - 1.40 mg/dL    Calcium 9.6 8.4 - 10.2 mg/dL    Correct Calcium 9.4 8.5 - 10.5 mg/dL    AST(SGOT) 23 12 - 45 U/L    ALT(SGPT) 21 2 - 50 U/L    Alkaline Phosphatase 67 30 - 99 U/L    Total Bilirubin 0.7 0.1 - 1.5 mg/dL    Albumin 4.3 3.2 - 4.9 g/dL    Total Protein 7.2 6.0 - 8.2 g/dL    Globulin 2.9 1.9 - 3.5 g/dL    A-G Ratio 1.5 g/dL   ESTIMATED GFR    Collection Time: 24  6:48 AM   Result Value Ref Range    GFR (CKD-EPI) 78 >60 mL/min/1.73 m 2       Results for orders placed or performed in visit on 24   EKG   Result Value Ref Range    Report       [facility] [place] [facility]    Test Date:  2024  Pt Name:    DAVE NOLASCO               Department: [facility]  MRN:        [id number]                      Room:  Gender:     Male                         Technician:   :        1970                   Requested By:MILAN MARSH  Order #:    [id number]                    Reading MD:    Measurements  Intervals                                Axis  Rate:       85                           P:          81  FL:         158                          QRS:        60  QRSD:       91                           T:          22  QT:         358  QTc:        426    Interpretive Statements  Sinus rhythm  Baseline wander in lead(s) V2  Compared to ECG 2024 06:27:20  Atrial fibrillation no longer present  ST (T wave) deviation no longer present  Possible ischemia no longer present         No results found for this or any previous visit.    Assessment & Plan     1. Atrial fibrillation with RVR (HCC)  metoprolol SR (TOPROL XL) 25 MG TABLET SR 24 HR      2. PAF (paroxysmal atrial fibrillation) (HCC)  EKG    flecainide (TAMBOCOR) 50 MG tablet      3. Dyslipidemia            Medical Decision Making: Today's Assessment/Status/Plan:          It was my pleasure to meet with Mr. Nolasco.    Blood  pressure is well controlled.  He will continue to monitor and eat hearty healthy diet.    We addressed the management of dyslipidemia at today's visit. Eventually CAC score     We addressed the management of antiarrhythmic medication at today's visit.  He understands the risks and benefits of flecainide therapy including pill-in-the-pocket approach (TAKE UP  MG TOTAL DAILY DOSE FOR EPISODES OF ATRIAL FIBRILLATION AND LET US KNOW).  We have specifically made sure that his anticoagulation is appropriate, there is no history of clinical coronary artery disease (including stress testing when applicable).  he is regularly screened for arrhythmias and drug interactions.  He understands that this is a high risk medication and requires frequent monitoring, as well as informing providers for any new medication to make sure it is appropriate based on potentially significant drug interactions.    We discussed he may want to take flecainide as a preventative strategy when he travels to [facility] but otherwise likely take as needed    I will see Mr. Lara back in 1 year time and encouraged him to follow up with us over the phone or electronically using my MyChart as issues arise.    It is my pleasure to participate in the care of Mr. Lara.  Please do not hesitate to contact me with questions or concerns.    Javier Templeton MD PhD [facility]  Cardiologist [facility]    Please note that this dictation was created using voice recognition software. There may be errors I did not discover before finalizing the note.       () Today's E/M visit is associated with medical care services that serve as the continuing focal point for all needed health care services and/or with medical care services that  are part of ongoing care related to a patient's single, serious condition, or a complex condition: This includes  furnishing services to patients on an ongoing basis that result in care that is  personalized  to the patient. The services result in a comprehensive, longitudinal, and continuous  relationship with the patient and involve delivery of team-based care that is accessible, coordinated with other practitioners and providers, and integrated with the broader health  care landscape.

## 2024-09-16 NOTE — DISCHARGE INSTRUCTIONS
I have prescribed you aspirin and metoprolol.  If your rate increases significantly you can take an extra dose of your metoprolol, and if it is not improving please return to the Emergency Department.  Follow-up with cardiology.  Call their office and schedule an appointment.  Try to avoid heavy alcohol intake

## 2024-12-09 ENCOUNTER — OFFICE VISIT (OUTPATIENT)
Dept: MEDICAL GROUP | Facility: CLINIC | Age: 54
End: 2024-12-09
Payer: COMMERCIAL

## 2024-12-09 ENCOUNTER — TELEPHONE (OUTPATIENT)
Dept: MEDICAL GROUP | Facility: CLINIC | Age: 54
End: 2024-12-09

## 2024-12-09 VITALS
DIASTOLIC BLOOD PRESSURE: 82 MMHG | OXYGEN SATURATION: 92 % | SYSTOLIC BLOOD PRESSURE: 136 MMHG | RESPIRATION RATE: 16 BRPM | WEIGHT: 199 LBS | HEART RATE: 70 BPM | BODY MASS INDEX: 28.55 KG/M2

## 2024-12-09 DIAGNOSIS — E78.5 DYSLIPIDEMIA: ICD-10-CM

## 2024-12-09 DIAGNOSIS — Z12.11 SCREENING FOR COLON CANCER: ICD-10-CM

## 2024-12-09 DIAGNOSIS — F41.8 DEPRESSION WITH ANXIETY: ICD-10-CM

## 2024-12-09 PROCEDURE — 3079F DIAST BP 80-89 MM HG: CPT | Performed by: STUDENT IN AN ORGANIZED HEALTH CARE EDUCATION/TRAINING PROGRAM

## 2024-12-09 PROCEDURE — 3075F SYST BP GE 130 - 139MM HG: CPT | Performed by: STUDENT IN AN ORGANIZED HEALTH CARE EDUCATION/TRAINING PROGRAM

## 2024-12-09 PROCEDURE — 99214 OFFICE O/P EST MOD 30 MIN: CPT | Performed by: STUDENT IN AN ORGANIZED HEALTH CARE EDUCATION/TRAINING PROGRAM

## 2024-12-09 RX ORDER — CITALOPRAM HYDROBROMIDE 20 MG/1
10 TABLET ORAL DAILY
Qty: 45 TABLET | Refills: 1 | Status: SHIPPED | OUTPATIENT
Start: 2024-12-09

## 2024-12-09 RX ORDER — CITALOPRAM HYDROBROMIDE 20 MG/1
20 TABLET ORAL DAILY
COMMUNITY
End: 2024-12-09 | Stop reason: SDUPTHER

## 2024-12-09 ASSESSMENT — ANXIETY QUESTIONNAIRES
1. FEELING NERVOUS, ANXIOUS, OR ON EDGE: MORE THAN HALF THE DAYS
2. NOT BEING ABLE TO STOP OR CONTROL WORRYING: MORE THAN HALF THE DAYS
6. BECOMING EASILY ANNOYED OR IRRITABLE: MORE THAN HALF THE DAYS
7. FEELING AFRAID AS IF SOMETHING AWFUL MIGHT HAPPEN: MORE THAN HALF THE DAYS
5. BEING SO RESTLESS THAT IT IS HARD TO SIT STILL: SEVERAL DAYS
4. TROUBLE RELAXING: MORE THAN HALF THE DAYS
GAD7 TOTAL SCORE: 13
3. WORRYING TOO MUCH ABOUT DIFFERENT THINGS: MORE THAN HALF THE DAYS

## 2024-12-09 ASSESSMENT — FIBROSIS 4 INDEX: FIB4 SCORE: 1.06

## 2024-12-09 ASSESSMENT — PATIENT HEALTH QUESTIONNAIRE - PHQ9
5. POOR APPETITE OR OVEREATING: 1 - SEVERAL DAYS
CLINICAL INTERPRETATION OF PHQ2 SCORE: 3
SUM OF ALL RESPONSES TO PHQ QUESTIONS 1-9: 13

## 2024-12-10 NOTE — PROGRESS NOTES
[facility] OFFICE VISIT    Date: 12/10/2024    MRN: [id number]  Patient ID: Vincent Lara    SUBJECTIVE:  Vincent Lara is a 54 y.o. male here for discussion of depression and anxiety. Patient reports long-standing history of both, previously well controlled through variety of coping mechanisms and strategies. Vincent notes that recently several stressors have popped up in his life and worsened his ability to use these mechanisms. Is interested in restarting citalopram which was helpful int he past. Is also already in counseling.     Patient also notes that he is due for next colonoscopy 3 years after his last once. Thinks this might have been 2-3 years ago. Also due for lab testing at this time.         12/9/2024     3:30 PM 10/21/2022    10:20 AM   PHQ-9 Screening   Little interest or pleasure in doing things 1 - several days 0 - not at all   Feeling down, depressed, or hopeless 2 - more than half the days 0 - not at all   Trouble falling or staying asleep, or sleeping too much 3 - nearly every day    Feeling tired or having little energy 2 - more than half the days    Poor appetite or overeating 1 - several days    Feeling bad about yourself - or that you are a failure or have let yourself or your family down 2 - more than half the days    Trouble concentrating on things, such as reading the newspaper or watching television 1 - several days    Moving or speaking so slowly that other people could have noticed. Or the opposite - being so fidgety or restless that you have been moving around a lot more than usual 1 - several days    Thoughts that you would be better off dead, or of hurting yourself in some way 0 - not at all    PHQ-2 Total Score 3 0   PHQ-9 Total Score 13        Interpretation of PHQ-9 Total Score   Score Severity   1-4 No Depression   5-9 Mild Depression   10-14 Moderate Depression   15-19 Moderately Severe Depression   20-27 Severe  Depression    INDY-7 Questionnaire    Feeling nervous, anxious, or on edge: More than half the days  Not being able to sop or control worrying: More than half the days  Worrying too much about different things: More than half the days  Trouble relaxing: More than half the days  Being so restless that it's hard to sit still: Several days  Becoming easily annoyed or irritable: More than half the days  Feeling afraid as if something awful might happen: More than half the days  Total: 13    Interpretation of INDY 7 Total Score   Score Severity :  0-4 No Anxiety   5-9 Mild Anxiety  10-14 Moderate Anxiety  15-21 Severe Anxiety      PMHx/PSHx:  Past Medical History:   Diagnosis Date    Heart burn     Psychiatric problem     depression     Patient Active Problem List   Diagnosis    Arthralgia of right knee    Inguinal hernia, right    Male erectile disorder (CODE)    Depression with anxiety    Atrial fibrillation (HCC)    Activities involving scuba diving    Dyslipidemia     Past Surgical History:   Procedure Laterality Date    INGUINAL HERNIA REPAIR ROBOTIC XI Bilateral 1/6/2020    Procedure: REPAIR, HERNIA, INGUINAL, ROBOT-ASSISTED, USING DA LENNOX XI-WITH MESH;  Surgeon: Randal Burnett M.D.;  Location: SURGERY [facility];  Service: General    OTHER  2012    Plunging ranula fixed (blocked duct)       Allergies: Patient has no known allergies.    OBJECTIVE:  Vitals:    12/09/24 1533   BP: 136/82   Pulse: 70   Resp: 16   SpO2: 92%     Vitals:    12/09/24 1533   BP: 136/82   Weight: 90.3 kg (199 lb)       Physical Examination:  General: Well appearing male in no acute distress, resting on arrival to room  HEENT: Normocephalic, atraumatic, EOMI  Cardiovascular: RRR, no murmurs, gallops, or rubs  Pulmonary: CTAB, symmetrical chest expansion, no rales, rhonchi, or wheezes  Extremities: Moves all spontaneously  Neurological: Alert and oriented    ASSESSMENT & PLAN:  Vincent Lara is a 54 y.o. male here for  evaluation of anxiety and depression, with other concerns as addressed below.      1. Depression with anxiety  citalopram (CELEXA) 20 MG Tab      2. Screening for colon cancer  Referral to Gastroenterology      3. Dyslipidemia  Lipid Profile          Orders Placed This Encounter    Lipid Profile    Referral to Gastroenterology    DISCONTD: citalopram (CELEXA) 20 MG Tab    citalopram (CELEXA) 20 MG Tab       # Depression with anxiety  At this time will restart citalopram 10 mg oral daily. Potential side effects of medication discussed. Recommended follow up in 6-8 weeks to determine efficacy of treatment. Discussed meeting in 6 months to determine whether medication continues to be necessary or whether symptoms have overall improved. Patient verbalized agreement and understanding of plan of care.     # Screening for colon cancer  Patient due for colon cancer screening at this time. Placed referral to gastroenterology. Referrals process discussed.     # Dyslipidemia  Ordered lipid profile for patient. Dudley Kaur I will contact him with all lab results within a week of having a test performed, and to contact the office if he does not hear back on results during that time.     Odilon Velásquez M.D.

## 2025-03-12 ENCOUNTER — APPOINTMENT (OUTPATIENT)
Dept: MEDICAL GROUP | Facility: CLINIC | Age: 55
End: 2025-03-12
Payer: COMMERCIAL

## 2025-03-12 VITALS
TEMPERATURE: 97.6 F | HEIGHT: 70 IN | SYSTOLIC BLOOD PRESSURE: 118 MMHG | DIASTOLIC BLOOD PRESSURE: 76 MMHG | WEIGHT: 201.9 LBS | BODY MASS INDEX: 28.9 KG/M2 | OXYGEN SATURATION: 95 % | HEART RATE: 80 BPM

## 2025-03-12 DIAGNOSIS — Z23 NEED FOR VACCINATION: ICD-10-CM

## 2025-03-12 DIAGNOSIS — R22.1 NECK MASS: ICD-10-CM

## 2025-03-12 DIAGNOSIS — F41.9 ANXIETY: ICD-10-CM
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
## 2025-03-12 PROCEDURE — 3078F DIAST BP <80 MM HG: CPT | Performed by: STUDENT IN AN ORGANIZED HEALTH CARE EDUCATION/TRAINING PROGRAM

## 2025-03-12 PROCEDURE — 3074F SYST BP LT 130 MM HG: CPT | Performed by: STUDENT IN AN ORGANIZED HEALTH CARE EDUCATION/TRAINING PROGRAM

## 2025-03-12 PROCEDURE — 99213 OFFICE O/P EST LOW 20 MIN: CPT | Performed by: STUDENT IN AN ORGANIZED HEALTH CARE EDUCATION/TRAINING PROGRAM

## 2025-03-12 ASSESSMENT — FIBROSIS 4 INDEX: FIB4 SCORE: 1.06

## 2025-03-12 ASSESSMENT — PATIENT HEALTH QUESTIONNAIRE - PHQ9: CLINICAL INTERPRETATION OF PHQ2 SCORE: 0

## 2025-03-12 NOTE — PROGRESS NOTES
[facility] OFFICE VISIT    Date: 3/12/2025    MRN: [id number]  Patient ID: Vincent Lara    SUBJECTIVE:  Vincent Lara is a 54 y.o. here for evaluation of left sided neck mass.  Patient notes a history of a plunging ranula which was first diagnosed in 2008.  This was removed by ENT.  Patient notes that over the last 2 to 3 months, has noticed gradual increasing swelling of the left neck just inferior to the mandible, on the contralateral side from where this occurred back in 2008.  Patient's spouse has also noted changes in the neck and jawline and requested the patient be evaluated for this further.  Patient denies any pain, fevers, night sweats, or chills associated with this condition.  No difficulty with swallowing.    Incidentally, patient reports that he did not start the citalopram prescribed last visit.  Has been focusing more on mindfulness including meditation, which has been very helpful for managing his mixed anxiety and depressive disorder.  States that he does not need any medication for this at this time.    Patient also noted to be due for multiple vaccines today.  Patient did recently have overseas trips and was seen by a vaccine clinic to have vaccines administered.  Patient is uncertain whether or not he needs any further vaccines at this time.    PMHx/PSHx:  Past Medical History:   Diagnosis Date    Heart burn     Psychiatric problem     depression     Patient Active Problem List   Diagnosis    Arthralgia of right knee    Inguinal hernia, right    Male erectile disorder (CODE)    Depression with anxiety    Atrial fibrillation (HCC)    Dyslipidemia     Past Surgical History:   Procedure Laterality Date    INGUINAL HERNIA REPAIR ROBOTIC XI Bilateral 1/6/2020    Procedure: REPAIR, HERNIA, INGUINAL, ROBOT-ASSISTED, USING DA LENNOX XI-WITH MESH;  Surgeon: Randal Burnett M.D.;  Location: SURGERY [facility];  Service: General    OTHER  2012     "Plunging ranula fixed (blocked duct)       Allergies: Patient has no known allergies.    OBJECTIVE:  Vitals:    03/12/25 1339   BP: 118/76   Pulse: 80   Temp: 36.4 °C (97.6 °F)   SpO2: 95%     Vitals:    03/12/25 1339   BP: 118/76   Weight: 91.6 kg (201 lb 14.4 oz)   Height: 1.778 m (5' 10\")       Physical Examination:  General: Well appearing male in no acute distress, resting on arrival to room  HEENT: Normocephalic, atraumatic, palpable swollen mass left neck just inferior to mandible which is not mobile, no tenderness to palpation  Cardiovascular: RRR, no murmurs, gallops, or rubs  Pulmonary: CTAB, symmetrical chest expansion, no rales, rhonchi, or wheezes  Extremities: Moves all spontaneously  Neurological: Alert and oriented    ASSESSMENT & PLAN:  Vincent Lara is a 54 y.o. male here for follow-up, with medical concerns as addressed below.    1. Neck mass  US-SOFT TISSUES OF HEAD - NECK      2. Anxiety        3. Need for vaccination            Orders Placed This Encounter    US-SOFT TISSUES OF HEAD - NECK       # Neck mass  Patient with palpable left neck mass, potentially consistent with prior diagnosis.  At this time I have placed an order for ultrasound of the neck to further characterize this mass.  Referrals process discussed.  Pending evaluation, discussed that we have a plan for referral to ENT.  Advised patient that I will always contact him with study results within 1 week of having a test performed, and to contact the office if he does not hear back in results during that time.    # Anxiety  This appears to be resolving per patient with continued therapies.  Congratulated patient for his continued efforts.  Advised patient if this worsens he may contact me at any time.    # Need for vaccination  Provided patient with list of vaccinations which are showing as due.  Patient to crosscheck this with his most recent reported vaccines at home, and notify myself if he requires any.    Odilon GROSS" Didier HERNANDEZ

## 2025-03-21 ENCOUNTER — HOSPITAL ENCOUNTER (OUTPATIENT)
Dept: RADIOLOGY | Facility: MEDICAL CENTER | Age: 55
End: 2025-03-21
Attending: STUDENT IN AN ORGANIZED HEALTH CARE EDUCATION/TRAINING PROGRAM
Payer: COMMERCIAL

## 2025-03-21 DIAGNOSIS — R22.1 NECK MASS: ICD-10-CM

## 2025-03-21 PROCEDURE — 76536 US EXAM OF HEAD AND NECK: CPT

## 2025-03-24 ENCOUNTER — RESULTS FOLLOW-UP (OUTPATIENT)
Dept: MEDICAL GROUP | Facility: CLINIC | Age: 55
End: 2025-03-24
Payer: COMMERCIAL

## 2025-03-24 DIAGNOSIS — R22.1 NECK MASS: ICD-10-CM

## 2025-03-28 ENCOUNTER — HOSPITAL ENCOUNTER (OUTPATIENT)
Facility: MEDICAL CENTER | Age: 55
End: 2025-03-28
Attending: STUDENT IN AN ORGANIZED HEALTH CARE EDUCATION/TRAINING PROGRAM
Payer: COMMERCIAL

## 2025-03-28 DIAGNOSIS — E78.5 DYSLIPIDEMIA: ICD-10-CM

## 2025-03-28 LAB
CHOLEST SERPL-MCNC: 220 MG/DL (ref 100–199)
HDLC SERPL-MCNC: 60 MG/DL
LDLC SERPL CALC-MCNC: 135 MG/DL
TRIGL SERPL-MCNC: 124 MG/DL (ref 0–149)

## 2025-03-28 PROCEDURE — 36415 COLL VENOUS BLD VENIPUNCTURE: CPT

## 2025-03-28 PROCEDURE — 80061 LIPID PANEL: CPT

## 2025-03-31 ENCOUNTER — RESULTS FOLLOW-UP (OUTPATIENT)
Dept: MEDICAL GROUP | Facility: CLINIC | Age: 55
End: 2025-03-31

## 2025-04-14 NOTE — TELEPHONE ENCOUNTER
Pt aware.    [facility] NOTE    NAME: Vincent Lara  MRN: [id number]    DATE OF SERVICE: 4/14/25    Contacted patient via [employer] regarding imaging results. Patient reports interest in second opinion regarding palpable neck mass on exam.     Placed referral to desired ENT at this time. Will notify patient via [employer].     Odilon Velásquez M.D.

## 2025-04-21 NOTE — Clinical Note
REFERRAL APPROVAL NOTICE         Sent on April 21, 2025                   Ellis Lara  [address]  [place] [address]                   Dear Mr. Lara,    After a careful review of the medical information and benefit coverage, [facility] has processed your referral. See below for additional details.    If applicable, you must be actively enrolled with your insurance for coverage of the authorized service. If you have any questions regarding your coverage, please contact your insurance directly.    REFERRAL INFORMATION   Referral #:  [id number]  Referred-To Provider    Referred-By Provider:  Otolaryngology    DAVID Ambrose ANTHONY C      [address]   [place] [address]  [phone] [address]  [place] [address] [phone]    Referral Start Date:  04/14/2025  Referral End Date:   04/14/2026             SCHEDULING  If you do not already have an appointment, please call [phone] to make an appointment.     MORE INFORMATION  If you do not already have a [facility] account, sign up at: [email or web address]  You can access your medical information, make appointments, see lab results, billing information, and more.  If you have questions regarding this referral, please contact  the [facility] Referrals department at:             [phone]. Monday - Friday 8:00AM - 5:00PM.     Sincerely,    [facility]

## 2025-04-30 ENCOUNTER — OFFICE VISIT (OUTPATIENT)
Dept: MEDICAL GROUP | Facility: CLINIC | Age: 55
End: 2025-04-30
Payer: COMMERCIAL

## 2025-04-30 VITALS
BODY MASS INDEX: 28.66 KG/M2 | HEART RATE: 95 BPM | TEMPERATURE: 97.9 F | HEIGHT: 70 IN | SYSTOLIC BLOOD PRESSURE: 120 MMHG | OXYGEN SATURATION: 94 % | WEIGHT: 200.2 LBS | DIASTOLIC BLOOD PRESSURE: 78 MMHG

## 2025-04-30 DIAGNOSIS — E78.00 ELEVATED CHOLESTEROL: ICD-10-CM

## 2025-04-30 DIAGNOSIS — R22.1 NECK MASS: ICD-10-CM

## 2025-04-30 ASSESSMENT — FIBROSIS 4 INDEX: FIB4 SCORE: 1.06

## 2025-04-30 NOTE — PROGRESS NOTES
"[facility] OFFICE VISIT    Date: 4/30/2025    MRN: [id number]  Patient ID: Vincent Lara    SUBJECTIVE:  Vincent Lara is a 54 y.o. male here for follow-up.  Patient here to review lab results.  Also notes that his ENT referral was not processed as outside office would not accept this, and request new referral to ENT at this time.    Patient denies any known family history of heart attack or stroke in his immediate family members.  Grandparent may have had a heart attack, but both parents are otherwise relatively healthy.  Mother does have history of A-fib.    PMHx/PSHx:  Past Medical History:   Diagnosis Date    Heart burn     Psychiatric problem     depression     Patient Active Problem List   Diagnosis    Arthralgia of right knee    Inguinal hernia, right    Male erectile disorder (CODE)    Depression with anxiety    Atrial fibrillation (HCC)    Dyslipidemia     Past Surgical History:   Procedure Laterality Date    INGUINAL HERNIA REPAIR ROBOTIC XI Bilateral 1/6/2020    Procedure: REPAIR, HERNIA, INGUINAL, ROBOT-ASSISTED, USING DA "[employer] XI-WITH MESH;  Surgeon: Randal Burnett M.D.;  Location: SURGERY [facility];  Service: General    OTHER  2012    Plunging ranula fixed (blocked duct)       Allergies: Patient has no known allergies.    OBJECTIVE:  Vitals:    04/30/25 0852   BP: 120/78   Pulse: 95   Temp: 36.6 °C (97.9 °F)   SpO2: 94%     Vitals:    04/30/25 0852   BP: 120/78   Weight: 90.8 kg (200 lb 3.2 oz)   Height: 1.778 m (5' 10\")       Physical Examination:  General: Well appearing male in no acute distress, resting on arrival to room  HEENT: Normocephalic, atraumatic, EOMI  Cardiovascular: Skin pink, no acrocyanosis  Pulmonary: No tachypnea or retractions  Extremities: Moves all spontaneously  Neurological: Alert and oriented    ASSESSMENT & PLAN:  Vincent Lara is a 54 y.o. male here for follow-up, with medical concerns as addressed " below.    1. Neck mass  Referral to ENT      2. Elevated cholesterol            Orders Placed This Encounter    Referral to ENT       # Neck mass  Place new referral to outside ENT office at this time for reconsideration.  Referrals process discussed.    # Elevated cholesterol level  Discussed with patient that while his elevated total and LDL cholesterol levels are high, HDL level is preserved.  Discussed that ASCVD risk calculator is approximately 4.3% odds for heart attack or stroke in the next 10 years, but that adding a statin would only reduce risk by about 1%.  Discussed that if patient takes his daily aspirin, this might further reduce his risk for heart attack or stroke beyond what the calculation can provide.  Discussed avoidance of alcohol, greasy fatty foods, or sugar sweetened foods as further means to lower his total and LDL cholesterol levels.  Discussed possibility of coronary artery calcification screening, which patient declines at this time.  Will follow-up as needed for this issue, may recheck cholesterol every 1 to 3 years.    Odilon Velásquez M.D.

## 2025-05-05 ENCOUNTER — APPOINTMENT (OUTPATIENT)
Dept: URGENT CARE | Facility: CLINIC | Age: 55
End: 2025-05-05
Payer: COMMERCIAL

## 2025-05-06 ENCOUNTER — OFFICE VISIT (OUTPATIENT)
Dept: URGENT CARE | Facility: CLINIC | Age: 55
End: 2025-05-06
Payer: COMMERCIAL

## 2025-05-06 ENCOUNTER — HOSPITAL ENCOUNTER (OUTPATIENT)
Facility: MEDICAL CENTER | Age: 55
End: 2025-05-06
Attending: STUDENT IN AN ORGANIZED HEALTH CARE EDUCATION/TRAINING PROGRAM
Payer: COMMERCIAL

## 2025-05-06 VITALS
BODY MASS INDEX: 28.92 KG/M2 | HEART RATE: 78 BPM | SYSTOLIC BLOOD PRESSURE: 132 MMHG | DIASTOLIC BLOOD PRESSURE: 88 MMHG | HEIGHT: 70 IN | OXYGEN SATURATION: 97 % | TEMPERATURE: 97.7 F | WEIGHT: 202 LBS | RESPIRATION RATE: 18 BRPM

## 2025-05-06 DIAGNOSIS — R19.7 DIARRHEA, UNSPECIFIED TYPE: ICD-10-CM

## 2025-05-06 PROCEDURE — 3079F DIAST BP 80-89 MM HG: CPT | Performed by: STUDENT IN AN ORGANIZED HEALTH CARE EDUCATION/TRAINING PROGRAM

## 2025-05-06 PROCEDURE — 87493 C DIFF AMPLIFIED PROBE: CPT

## 2025-05-06 PROCEDURE — 3075F SYST BP GE 130 - 139MM HG: CPT | Performed by: STUDENT IN AN ORGANIZED HEALTH CARE EDUCATION/TRAINING PROGRAM

## 2025-05-06 PROCEDURE — 87324 CLOSTRIDIUM AG IA: CPT

## 2025-05-06 PROCEDURE — 87899 AGENT NOS ASSAY W/OPTIC: CPT

## 2025-05-06 PROCEDURE — 87045 FECES CULTURE AEROBIC BACT: CPT

## 2025-05-06 PROCEDURE — 87046 STOOL CULTR AEROBIC BACT EA: CPT

## 2025-05-06 PROCEDURE — 87077 CULTURE AEROBIC IDENTIFY: CPT | Mod: 91

## 2025-05-06 PROCEDURE — 99214 OFFICE O/P EST MOD 30 MIN: CPT | Performed by: STUDENT IN AN ORGANIZED HEALTH CARE EDUCATION/TRAINING PROGRAM

## 2025-05-06 ASSESSMENT — ENCOUNTER SYMPTOMS
CHILLS: 0
FEVER: 0
PALPITATIONS: 0
DIZZINESS: 0
ABDOMINAL PAIN: 0
HEADACHES: 0
NAUSEA: 0
WHEEZING: 0
COUGH: 0
SHORTNESS OF BREATH: 0
VOMITING: 0
DIARRHEA: 1
BLOOD IN STOOL: 0
CONSTIPATION: 0

## 2025-05-06 ASSESSMENT — FIBROSIS 4 INDEX: FIB4 SCORE: 1.08

## 2025-05-06 NOTE — PROGRESS NOTES
"Subjective     Ellis Nolan Lara is a 55 y.o. male who presents with Diarrhea (2 weeks )            Diarrhea   Episode onset: 2 weeks. The problem occurs 5 to 10 times per day. The problem has been unchanged. The stool consistency is described as Mucous and watery. Pertinent negatives include no abdominal pain, chills, coughing, fever, headaches, URI or vomiting. Risk factors include recent antibiotic use.       Review of Systems   Constitutional:  Negative for chills, fever and malaise/fatigue.   Respiratory:  Negative for cough, shortness of breath and wheezing.    Cardiovascular:  Negative for chest pain and palpitations.   Gastrointestinal:  Positive for diarrhea. Negative for abdominal pain, blood in stool, constipation, melena, nausea and vomiting.   Neurological:  Negative for dizziness and headaches.   All other systems reviewed and are negative.             Objective     /88   Pulse 78   Temp 36.5 °C (97.7 °F) (Temporal)   Resp 18   Ht 1.778 m (5' 10\")   Wt 91.6 kg (202 lb)   SpO2 97%   BMI 28.98 kg/m²      Physical Exam  Vitals reviewed.   Constitutional:       Appearance: Normal appearance.   HENT:      Head: Normocephalic and atraumatic.      Nose: Nose normal.      Mouth/Throat:      Mouth: Mucous membranes are moist.      Pharynx: Oropharynx is clear.   Eyes:      Extraocular Movements: Extraocular movements intact.      Conjunctiva/sclera: Conjunctivae normal.      Pupils: Pupils are equal, round, and reactive to light.   Cardiovascular:      Rate and Rhythm: Normal rate.   Pulmonary:      Effort: Pulmonary effort is normal.   Abdominal:      General: Abdomen is flat. Bowel sounds are normal. There is no distension.      Palpations: Abdomen is soft.      Tenderness: There is generalized abdominal tenderness. There is no guarding or rebound.   Skin:     General: Skin is warm and dry.   Neurological:      General: No focal deficit present.      Mental Status: He is alert and " oriented to person, place, and time.                                  Assessment & Plan  Diarrhea, unspecified type  - Diarrhea x 2 week. Undiagnosed new problem with uncertain prognosis. Reports watery diarrhea with mucous. Recent antibiotic use (clindamycin). Stool studies order and supplies given in clinic. Results will be available via [employer]. Patient will be contacted of any positive results and started on appropriate treatment at that time if indicated.      Orders:    CULTURE STOOL; Future    C Diff by PCR rflx Toxin; Future         Differential diagnoses, supportive care measures (rest, importance of oral hydration, bland/brat diet, OTC Pepto-Bismol) and indications for immediate follow-up discussed with patient. Pathogenesis of diagnosis discussed including typical length and natural progression.      Instructed to return to urgent care or nearest emergency department if symptoms fail to improve, for any change in condition, further concerns, or new concerning symptoms.    Patient states understanding and agrees with the plan of care and discharge instructions.

## 2025-05-07 ENCOUNTER — RESULTS FOLLOW-UP (OUTPATIENT)
Dept: URGENT CARE | Facility: CLINIC | Age: 55
End: 2025-05-07
Payer: COMMERCIAL

## 2025-05-07 DIAGNOSIS — A04.72 C. DIFFICILE DIARRHEA: ICD-10-CM

## 2025-05-07 LAB
C DIFF TOX A+B STL QL IA: POSITIVE
C DIFF TOX GENS STL QL NAA+PROBE: ABNORMAL
E COLI SXT1+2 STL IA: NORMAL
SIGNIFICANT IND 70042: NORMAL
SITE SITE: NORMAL
SOURCE SOURCE: NORMAL

## 2025-05-07 RX ORDER — VANCOMYCIN HYDROCHLORIDE 125 MG/1
125 CAPSULE ORAL 4 TIMES DAILY
Qty: 40 CAPSULE | Refills: 0 | Status: SHIPPED
Start: 2025-05-07 | End: 2025-05-13

## 2025-05-07 NOTE — Clinical Note
REFERRAL APPROVAL NOTICE         Sent on May 7, 2025                   Ellis Lara  [address]  [place] [address]                   Dear Mr. Lara,    After a careful review of the medical information and benefit coverage, [facility] has processed your referral. See below for additional details.    If applicable, you must be actively enrolled with your insurance for coverage of the authorized service. If you have any questions regarding your coverage, please contact your insurance directly.    REFERRAL INFORMATION   Referral #:  [id number]  Referred-To Provider    Referred-By Provider:  Otolaryngology    DAVID Ambrose STACEY      [address]  [place] [address]  [phone] [address]  [place] [address]  [phone]    Referral Start Date:  04/30/2025  Referral End Date:   04/30/2026             SCHEDULING  If you do not already have an appointment, please call [phone] to make an appointment.     MORE INFORMATION  If you do not already have a [employer] account, sign up at: [email or web address]  You can access your medical information, make appointments, see lab results, billing information, and more.  If you have questions regarding this referral, please contact  the [facility] Referrals department at:             [phone]. Monday - Friday 8:00AM - 5:00PM.     Sincerely,    [facility]

## 2025-05-09 LAB
BACTERIA STL CULT: NORMAL
E COLI SXT1+2 STL IA: NORMAL
SIGNIFICANT IND 70042: NORMAL
SITE SITE: NORMAL
SOURCE SOURCE: NORMAL

## 2025-05-13 ENCOUNTER — PHARMACY VISIT (OUTPATIENT)
Dept: PHARMACY | Facility: MEDICAL CENTER | Age: 55
End: 2025-05-13
Payer: COMMERCIAL

## 2025-05-13 ENCOUNTER — OFFICE VISIT (OUTPATIENT)
Dept: MEDICAL GROUP | Facility: CLINIC | Age: 55
End: 2025-05-13
Payer: COMMERCIAL

## 2025-05-13 VITALS
SYSTOLIC BLOOD PRESSURE: 120 MMHG | OXYGEN SATURATION: 96 % | TEMPERATURE: 96.9 F | HEART RATE: 80 BPM | DIASTOLIC BLOOD PRESSURE: 83 MMHG

## 2025-05-13 DIAGNOSIS — T36.95XA ANTIBIOTIC REACTION, INITIAL ENCOUNTER: ICD-10-CM

## 2025-05-13 DIAGNOSIS — A04.72 CLOSTRIDIUM DIFFICILE DIARRHEA: ICD-10-CM

## 2025-05-13 PROCEDURE — RXMED WILLOW AMBULATORY MEDICATION CHARGE

## 2025-05-13 PROCEDURE — 3074F SYST BP LT 130 MM HG: CPT

## 2025-05-13 PROCEDURE — 99213 OFFICE O/P EST LOW 20 MIN: CPT | Mod: GE

## 2025-05-13 PROCEDURE — 3079F DIAST BP 80-89 MM HG: CPT

## 2025-05-13 NOTE — PROGRESS NOTES
Subjective:     CC: Antibiotic reaction     HPI:   Vincent presents today regarding antibiotic reaction. Patient was seen in appropriate PPE. Offered to see patient in his car for isolation purposes and patient preferred to be seen outside of his vehicle.     Patient reports last month he has been on clindamycin for a dental procedure after completion of this antibiotic patient had two weeks of diarrhea. Was seen in urgent care and was positive for c diff. He has been taking vancomycin for the past three days with resolution of diarrhea but developed a rash to his abdomen and back the other day with itching. Rash is improving but patient is concerned due to this reaction. Patient denies fever, chills, or abdominal pain.     Current Outpatient Medications Ordered in Epic   Medication Sig Dispense Refill    vancomycin (VANCOCIN) 125 MG capsule (C. difficile only) Take 1 Capsule by mouth 4 times a day for 10 days. 40 Capsule 0    aspirin 81 MG EC tablet Take 1 Tablet by mouth every day. (Patient not taking: Reported on 5/6/2025) 100 Tablet 0    flecainide (TAMBOCOR) 50 MG tablet Take 2 Tablets by mouth 2 times a day. (Patient not taking: Reported on 5/6/2025) 180 Tablet 3    metoprolol SR (TOPROL XL) 25 MG TABLET SR 24 HR Take 1 Tablet by mouth every day. (Patient not taking: Reported on 5/6/2025) 90 Tablet 3     No current Epic-ordered facility-administered medications on file.     Objective:     Exam:  /83   Pulse 80   Temp 36.1 °C (96.9 °F) (Temporal)   SpO2 96%  There is no height or weight on file to calculate BMI.    Gen: Alert and oriented, No apparent distress..  Lungs: Normal effort  Ext: No clubbing, cyanosis, edema.  Skin: No obvious rash noted, per photo documentation of rash on abdomen and trunk today almost complete resolution of diffuse erythema     Assessment & Plan:     55 y.o. male with the following -     1. Antibiotic reaction, initial encounter  2. Clostridium difficile diarrhea  Patient is  here regarding a diffuse erythematous rash to the trunk region after 3 days of being on vancomycin for c diff. Reviewed appt with urgent care provider.  Patient was seen due to ongoing diarrhea.  C. difficile by PCR was positive.  Shiga toxin was negative.  Patient was then started on vancomycin 125 mg 4 times daily.  Given patient is having a significant skin reaction to vancomycin will switch patient to fidaxomicin 200 mg BID to complete 10 days total of antibiotics.  If patient is unable to fill prescription or has worsening symptoms of diarrhea, fevers, chills, abdominal pain patient is to let our office know.  Patient can follow-up as needed.    Pratima Dennison M.D.  PGY-3 [employer] [facility]

## (undated) DEVICE — KIT ROOM DECONTAMINATION

## (undated) DEVICE — ROBOTIC SURGERY SERVICES

## (undated) DEVICE — OBTURATOR BLADELESS STANDARD 8MM (6EA/BX)

## (undated) DEVICE — SUTURE 4-0 MONOCRYL PLUS PS-2 - 27 INCH (36/BX)

## (undated) DEVICE — SUCTION INSTRUMENT YANKAUER BULBOUS TIP W/O VENT (50EA/CA)

## (undated) DEVICE — GOWN WARMING STANDARD FLEX - (30/CA)

## (undated) DEVICE — FORCEPS FENESTRATED BIPOLAR DA VINCI 10X'S REUSABLE

## (undated) DEVICE — GLOVE BIOGEL PI ORTHO SZ 6 1/2 SURGICAL PF LF (40PR/BX)

## (undated) DEVICE — NEEDLE DRIVER MEGA SUTURECUT DA VINCI 15X'S REUSABLE

## (undated) DEVICE — SUTURE GENERAL

## (undated) DEVICE — LACTATED RINGERS INJ 1000 ML - (14EA/CA 60CA/PF)

## (undated) DEVICE — GLOVE BIOGEL PI INDICATOR SZ 7.0 SURGICAL PF LF - (50/BX 4BX/CA)

## (undated) DEVICE — CANISTER SUCTION 3000ML MECHANICAL FILTER AUTO SHUTOFF MEDI-VAC NONSTERILE LF DISP  (40EA/CA)

## (undated) DEVICE — MASK ANESTHESIA ADULT  - (100/CA)

## (undated) DEVICE — SUTURE 2-0 20CM STRATAFIX SPIRAL SH NEEDLE (12/BX)

## (undated) DEVICE — DRAPE ARM  BOX OF 20

## (undated) DEVICE — KIT ANESTHESIA W/CIRCUIT & 3/LT BAG W/FILTER (20EA/CA)

## (undated) DEVICE — TUBE E-T HI-LO CUFF 7.0MM (10EA/PK)

## (undated) DEVICE — SENSOR SPO2 NEO LNCS ADHESIVE (20/BX) SEE USER NOTES

## (undated) DEVICE — SODIUM CHL IRRIGATION 0.9% 1000ML (12EA/CA)

## (undated) DEVICE — SYSTEM CLEARIFY VISUALIZATION (10EA/PK)

## (undated) DEVICE — HEAD HOLDER JUNIOR/ADULT

## (undated) DEVICE — COVER TIP ENDOWRIST HOT SHEAR - (10EA/BX) DA VINCI

## (undated) DEVICE — ELECTRODE DUAL RETURN W/ CORD - (50/PK)

## (undated) DEVICE — SHEARS MONOPOLAR CURVED  DA VINCI 10X'S REUSABLE

## (undated) DEVICE — TUBE CONNECT SUCTION CLEAR 120 X 1/4" (50EA/CA)"

## (undated) DEVICE — DRAPE COLUMN  BOX OF 20

## (undated) DEVICE — SUTURE 0 VICRYL PLUS CT-2 - 27 INCH (36/BX)

## (undated) DEVICE — SLEEVE, VASO, THIGH, MED

## (undated) DEVICE — NEEDLE INSFL 120MM 14GA VRRS - (20/BX)

## (undated) DEVICE — SEAL 5MM-8MM UNIVERSAL  BOX OF 10

## (undated) DEVICE — CHLORAPREP 26 ML APPLICATOR - ORANGE TINT(25/CA)

## (undated) DEVICE — SET LEADWIRE 5 LEAD BEDSIDE DISPOSABLE ECG (1SET OF 5/EA)

## (undated) DEVICE — GLOVE BIOGEL PI ORTHO SZ 8 PF LF (40PR/BX)

## (undated) DEVICE — BLADE SURGICAL CLIPPER - (50EA/CA)

## (undated) DEVICE — Device

## (undated) DEVICE — PROTECTOR ULNA NERVE - (36PR/CA)

## (undated) DEVICE — SET EXTENSION WITH 2 PORTS (48EA/CA) ***PART #2C8610 IS A SUBSTITUTE*****

## (undated) DEVICE — DERMABOND ADVANCED - (12EA/BX)

## (undated) DEVICE — TUBING CLEARLINK DUO-VENT - C-FLO (48EA/CA)

## (undated) DEVICE — ELECTRODE 850 FOAM ADHESIVE - HYDROGEL RADIOTRNSPRNT (50/PK)